# Patient Record
Sex: FEMALE | NOT HISPANIC OR LATINO | Employment: OTHER | URBAN - METROPOLITAN AREA
[De-identification: names, ages, dates, MRNs, and addresses within clinical notes are randomized per-mention and may not be internally consistent; named-entity substitution may affect disease eponyms.]

---

## 2023-09-03 PROBLEM — E03.9 HYPOTHYROIDISM (ACQUIRED): Status: ACTIVE | Noted: 2023-09-03

## 2023-09-03 PROBLEM — D80.3 IGG DEFICIENCY (MULTI): Status: ACTIVE | Noted: 2023-09-03

## 2023-09-03 PROBLEM — E83.52 HYPERCALCEMIA: Status: ACTIVE | Noted: 2023-09-03

## 2023-09-03 PROBLEM — M81.0 OSTEOPOROSIS: Status: ACTIVE | Noted: 2023-09-03

## 2023-09-03 PROBLEM — M05.79 RHEUMATOID ARTHRITIS INVOLVING MULTIPLE SITES WITH POSITIVE RHEUMATOID FACTOR (MULTI): Status: ACTIVE | Noted: 2023-09-03

## 2023-09-03 PROBLEM — I10 BENIGN HYPERTENSION: Status: ACTIVE | Noted: 2023-09-03

## 2023-09-03 PROBLEM — D75.89 MACROCYTOSIS: Status: ACTIVE | Noted: 2023-09-03

## 2023-09-03 PROBLEM — N18.2 CKD (CHRONIC KIDNEY DISEASE) STAGE 2, GFR 60-89 ML/MIN: Status: ACTIVE | Noted: 2023-09-03

## 2023-09-03 PROBLEM — K21.9 GASTROESOPHAGEAL REFLUX DISEASE: Status: ACTIVE | Noted: 2023-09-03

## 2023-09-03 PROBLEM — E55.9 VITAMIN D DEFICIENCY: Status: ACTIVE | Noted: 2023-09-03

## 2023-09-03 PROBLEM — N95.1 MENOPAUSAL SYMPTOM: Status: ACTIVE | Noted: 2023-09-03

## 2023-09-03 PROBLEM — M31.5 GIANT CELL ARTERITIS WITH POLYMYALGIA RHEUMATICA (MULTI): Status: ACTIVE | Noted: 2023-09-03

## 2023-09-03 PROBLEM — R89.9 ABNORMAL LABORATORY TEST RESULT: Status: ACTIVE | Noted: 2023-09-03

## 2023-09-03 PROBLEM — F32.A DEPRESSION: Status: ACTIVE | Noted: 2023-09-03

## 2023-09-03 PROBLEM — R73.9 HYPERGLYCEMIA: Status: ACTIVE | Noted: 2023-09-03

## 2023-09-03 PROBLEM — E78.5 DYSLIPIDEMIA: Status: ACTIVE | Noted: 2023-09-03

## 2023-09-03 RX ORDER — LISINOPRIL AND HYDROCHLOROTHIAZIDE 12.5; 2 MG/1; MG/1
1 TABLET ORAL DAILY
COMMUNITY
End: 2023-12-08

## 2023-09-03 RX ORDER — FOLIC ACID 1 MG/1
1 TABLET ORAL DAILY
COMMUNITY
End: 2024-03-19

## 2023-09-03 RX ORDER — ALENDRONATE SODIUM 70 MG/1
70 TABLET ORAL
COMMUNITY
Start: 2021-05-12 | End: 2023-12-11

## 2023-09-03 RX ORDER — MELOXICAM 7.5 MG/1
7.5 TABLET ORAL DAILY PRN
COMMUNITY
End: 2023-10-23 | Stop reason: DRUGHIGH

## 2023-09-03 RX ORDER — VIT A/VIT C/VIT E/ZINC/COPPER 4296-226
CAPSULE ORAL
COMMUNITY

## 2023-09-03 RX ORDER — ELECTROLYTES/DEXTROSE
1 SOLUTION, ORAL ORAL DAILY
COMMUNITY

## 2023-09-03 RX ORDER — HYDROXYCHLOROQUINE SULFATE 200 MG/1
200 TABLET, FILM COATED ORAL DAILY
COMMUNITY
End: 2023-12-14 | Stop reason: ALTCHOICE

## 2023-09-03 RX ORDER — LEVOTHYROXINE SODIUM 50 UG/1
50 TABLET ORAL
COMMUNITY
End: 2024-03-19

## 2023-09-03 RX ORDER — CHOLECALCIFEROL (VITAMIN D3) 50 MCG
1 TABLET ORAL DAILY
COMMUNITY

## 2023-09-03 RX ORDER — TOCILIZUMAB 20 MG/ML
INJECTION, SOLUTION, CONCENTRATE INTRAVENOUS
COMMUNITY
End: 2023-12-21 | Stop reason: SDUPTHER

## 2023-09-03 RX ORDER — AMLODIPINE BESYLATE 5 MG/1
5 TABLET ORAL DAILY
COMMUNITY
End: 2023-12-14 | Stop reason: SDUPTHER

## 2023-09-03 RX ORDER — LANOLIN ALCOHOL/MO/W.PET/CERES
1 CREAM (GRAM) TOPICAL DAILY
COMMUNITY

## 2023-09-03 RX ORDER — PYRIDOXINE HCL (VITAMIN B6) 250 MG
250 TABLET ORAL DAILY
COMMUNITY

## 2023-09-03 RX ORDER — CYCLOSPORINE 0.5 MG/ML
1 EMULSION OPHTHALMIC 2 TIMES DAILY
COMMUNITY
End: 2024-04-22 | Stop reason: ALTCHOICE

## 2023-10-23 ENCOUNTER — CLINICAL SUPPORT (OUTPATIENT)
Dept: RHEUMATOLOGY | Facility: CLINIC | Age: 78
End: 2023-10-23
Payer: MEDICARE

## 2023-10-23 VITALS
RESPIRATION RATE: 16 BRPM | HEART RATE: 74 BPM | SYSTOLIC BLOOD PRESSURE: 153 MMHG | BODY MASS INDEX: 24.32 KG/M2 | TEMPERATURE: 97.7 F | WEIGHT: 138.4 LBS | OXYGEN SATURATION: 99 % | DIASTOLIC BLOOD PRESSURE: 60 MMHG

## 2023-10-23 DIAGNOSIS — M05.79 RHEUMATOID ARTHRITIS INVOLVING MULTIPLE SITES WITH POSITIVE RHEUMATOID FACTOR (MULTI): ICD-10-CM

## 2023-10-23 PROBLEM — M81.0 AGE-RELATED OSTEOPOROSIS WITHOUT CURRENT PATHOLOGICAL FRACTURE: Status: ACTIVE | Noted: 2023-10-23

## 2023-10-23 PROCEDURE — 96413 CHEMO IV INFUSION 1 HR: CPT | Performed by: INTERNAL MEDICINE

## 2023-10-23 PROCEDURE — 2500000004 HC RX 250 GENERAL PHARMACY W/ HCPCS (ALT 636 FOR OP/ED): Mod: JZ | Performed by: INTERNAL MEDICINE

## 2023-10-23 RX ORDER — EPINEPHRINE 0.3 MG/.3ML
0.3 INJECTION SUBCUTANEOUS EVERY 5 MIN PRN
Status: CANCELLED | OUTPATIENT
Start: 2023-10-23

## 2023-10-23 RX ORDER — FAMOTIDINE 10 MG/ML
20 INJECTION INTRAVENOUS ONCE AS NEEDED
Status: CANCELLED | OUTPATIENT
Start: 2023-10-23

## 2023-10-23 RX ORDER — DIPHENHYDRAMINE HYDROCHLORIDE 50 MG/ML
50 INJECTION INTRAMUSCULAR; INTRAVENOUS AS NEEDED
Status: CANCELLED | OUTPATIENT
Start: 2023-10-23

## 2023-10-23 RX ORDER — MELOXICAM 15 MG/1
15 TABLET ORAL DAILY
COMMUNITY
Start: 2023-03-22 | End: 2024-01-26 | Stop reason: ALTCHOICE

## 2023-10-23 RX ORDER — FAMOTIDINE 10 MG/ML
20 INJECTION INTRAVENOUS ONCE AS NEEDED
Status: CANCELLED | OUTPATIENT
Start: 2023-11-20

## 2023-10-23 RX ORDER — ALBUTEROL SULFATE 0.83 MG/ML
3 SOLUTION RESPIRATORY (INHALATION) AS NEEDED
Status: CANCELLED | OUTPATIENT
Start: 2023-10-23

## 2023-10-23 RX ORDER — EPINEPHRINE 0.3 MG/.3ML
0.3 INJECTION SUBCUTANEOUS EVERY 5 MIN PRN
Status: CANCELLED | OUTPATIENT
Start: 2023-11-20

## 2023-10-23 RX ORDER — ALBUTEROL SULFATE 0.83 MG/ML
3 SOLUTION RESPIRATORY (INHALATION) AS NEEDED
Status: CANCELLED | OUTPATIENT
Start: 2023-11-20

## 2023-10-23 RX ORDER — DIPHENHYDRAMINE HYDROCHLORIDE 50 MG/ML
50 INJECTION INTRAMUSCULAR; INTRAVENOUS AS NEEDED
Status: CANCELLED | OUTPATIENT
Start: 2023-11-20

## 2023-10-23 RX ORDER — KETOROLAC TROMETHAMINE 30 MG/ML
30 INJECTION, SOLUTION INTRAMUSCULAR; INTRAVENOUS ONCE
Status: CANCELLED | OUTPATIENT
Start: 2023-10-23 | End: 2023-10-23

## 2023-10-23 RX ADMIN — TOCILIZUMAB 400 MG: 20 INJECTION, SOLUTION, CONCENTRATE INTRAVENOUS at 15:05

## 2023-10-23 NOTE — PROGRESS NOTES
Patient tolerated Actemra infusion without any signs or symptoms of adverse reaction.  IV site flushed with 10 ml NS, IV needle removed and site dressed with 2x2 gauze and bandaid.

## 2023-11-20 ENCOUNTER — APPOINTMENT (OUTPATIENT)
Dept: RHEUMATOLOGY | Facility: CLINIC | Age: 78
End: 2023-11-20
Payer: MEDICARE

## 2023-11-21 ENCOUNTER — CLINICAL SUPPORT (OUTPATIENT)
Dept: RHEUMATOLOGY | Facility: CLINIC | Age: 78
End: 2023-11-21
Payer: MEDICARE

## 2023-11-21 DIAGNOSIS — M05.79 RHEUMATOID ARTHRITIS INVOLVING MULTIPLE SITES WITH POSITIVE RHEUMATOID FACTOR (MULTI): ICD-10-CM

## 2023-11-21 PROCEDURE — 96413 CHEMO IV INFUSION 1 HR: CPT | Performed by: INTERNAL MEDICINE

## 2023-11-21 PROCEDURE — 2500000004 HC RX 250 GENERAL PHARMACY W/ HCPCS (ALT 636 FOR OP/ED): Mod: JZ | Performed by: INTERNAL MEDICINE

## 2023-11-21 RX ORDER — EPINEPHRINE 0.3 MG/.3ML
0.3 INJECTION SUBCUTANEOUS EVERY 5 MIN PRN
Status: CANCELLED | OUTPATIENT
Start: 2023-12-18

## 2023-11-21 RX ORDER — ALBUTEROL SULFATE 0.83 MG/ML
3 SOLUTION RESPIRATORY (INHALATION) AS NEEDED
Status: CANCELLED | OUTPATIENT
Start: 2023-12-18

## 2023-11-21 RX ORDER — DIPHENHYDRAMINE HYDROCHLORIDE 50 MG/ML
50 INJECTION INTRAMUSCULAR; INTRAVENOUS AS NEEDED
Status: CANCELLED | OUTPATIENT
Start: 2023-12-18

## 2023-11-21 RX ORDER — FAMOTIDINE 10 MG/ML
20 INJECTION INTRAVENOUS ONCE AS NEEDED
Status: CANCELLED | OUTPATIENT
Start: 2023-12-18

## 2023-11-21 RX ADMIN — TOCILIZUMAB: 20 INJECTION, SOLUTION, CONCENTRATE INTRAVENOUS at 13:40

## 2023-12-08 DIAGNOSIS — I10 BENIGN HYPERTENSION: Primary | ICD-10-CM

## 2023-12-08 RX ORDER — LISINOPRIL AND HYDROCHLOROTHIAZIDE 12.5; 2 MG/1; MG/1
1 TABLET ORAL DAILY
Qty: 90 TABLET | Refills: 0 | Status: SHIPPED | OUTPATIENT
Start: 2023-12-08 | End: 2024-03-15

## 2023-12-11 DIAGNOSIS — M81.0 OSTEOPOROSIS, UNSPECIFIED OSTEOPOROSIS TYPE, UNSPECIFIED PATHOLOGICAL FRACTURE PRESENCE: Primary | ICD-10-CM

## 2023-12-11 RX ORDER — ALENDRONATE SODIUM 70 MG/1
TABLET ORAL
Qty: 12 TABLET | Refills: 3 | Status: SHIPPED | OUTPATIENT
Start: 2023-12-11

## 2023-12-13 NOTE — PROGRESS NOTES
Subjective   Patient ID: Pat Delarosa is a 78 y.o. female who presents for Annual Exam.    HPI     Has history of hypertension, Hypothyroidism, Rheumatoid arthritis, Osteoporosis.         H/O Rheumatoid arthritis- Seeing Dr Boggs. Pain in lower back improving since she started infusions. Very active daily, yard work or snow blowing worsens her back pain.         H/O hypertension- compliant with medications and low-salt diet. Denied any chest pain, shortness of breath, pedal edema.         H/O hypothyroidism- compliant with medication. No recent change in levothyroxine dose.         H/O Post menopausal osteoporosis- Seeing Dr Boggs- Started alendronate in 2021. Denied any medication side effects.         complaining of generalized weakness, occasionally bilateral leg and arm weakness especially while working in the yard. leg weakness and arm weakness working in the yard.     Stated sometimes she feel depressed,  No suicidal ideations.           Does not want to get mammgram's any more.     Sees ophthalmologist every 4 weeks for macular degeneration  Complaining of difficltuy swallowing tablets, and occasionally food since last few months, denied any loss of appetite/weight loss      Review of Systems   Constitutional:  Negative for chills, fatigue and unexpected weight change.   HENT:  Negative for postnasal drip, sinus pressure and trouble swallowing.    Respiratory:  Negative for cough, shortness of breath and wheezing.    Cardiovascular:  Negative for chest pain, palpitations and leg swelling.   Gastrointestinal:  Negative for abdominal pain, blood in stool, nausea and vomiting.        Regional dysphagia   Endocrine: Negative for polydipsia, polyphagia and polyuria.   Genitourinary:  Negative for dysuria and frequency.   Musculoskeletal:  Positive for arthralgias. Negative for back pain and myalgias.   Skin:  Negative for rash.   Neurological:  Negative for tremors, seizures and numbness.  "  Psychiatric/Behavioral:  Positive for behavioral problems. Negative for suicidal ideas.        Objective   /88 (BP Location: Left arm, Patient Position: Sitting, BP Cuff Size: Small adult)   Pulse 80   Temp 36.4 °C (97.6 °F) (Temporal)   Ht 1.575 m (5' 2\")   Wt 62.7 kg (138 lb 3.2 oz)   SpO2 98%   BMI 25.28 kg/m²     Physical Exam  Constitutional:       General: She is not in acute distress.  HENT:      Head: Normocephalic and atraumatic.      Right Ear: Tympanic membrane normal.      Left Ear: Tympanic membrane normal.   Eyes:      Extraocular Movements: Extraocular movements intact.      Conjunctiva/sclera: Conjunctivae normal.      Pupils: Pupils are equal, round, and reactive to light.   Neck:      Vascular: No carotid bruit.   Cardiovascular:      Rate and Rhythm: Normal rate and regular rhythm.      Pulses: Normal pulses.      Heart sounds: No murmur heard.  Pulmonary:      Effort: Pulmonary effort is normal.      Breath sounds: Normal breath sounds. No wheezing or rales.   Abdominal:      General: Bowel sounds are normal.      Palpations: Abdomen is soft. There is no mass.      Tenderness: There is no abdominal tenderness. There is no guarding.   Musculoskeletal:         General: Normal range of motion.      Cervical back: Normal range of motion and neck supple.      Right lower leg: No edema.      Left lower leg: No edema.   Lymphadenopathy:      Cervical: No cervical adenopathy.   Skin:     General: Skin is warm and dry.      Findings: No lesion.   Neurological:      General: No focal deficit present.      Mental Status: She is alert and oriented to person, place, and time.      Sensory: No sensory deficit.      Gait: Gait normal.   Psychiatric:         Mood and Affect: Mood normal.         Assessment/Plan       Pat was seen today for annual exam.  Diagnoses and all orders for this visit:  Medicare annual wellness visit, subsequent (Primary)  Rheumatoid arthritis involving multiple sites " with positive rheumatoid factor (CMS/HCC)  Giant cell arteritis with polymyalgia rheumatica (CMS/HCC)  IgG deficiency (CMS/HCC)  Hypothyroidism (acquired)  Benign hypertension  -     amLODIPine (Norvasc) 10 mg tablet; Take 1 tablet (10 mg) by mouth once daily.  Dyslipidemia  Stage 3a chronic kidney disease (CMS/HCC)  Depression, unspecified depression type  -     sertraline (Zoloft) 25 mg tablet; Take 1 tablet (25 mg) by mouth once daily.  Post-menopausal osteoporosis  -     XR DEXA bone density; Future  Pharyngeal dysphagia  -     FL modified barium swallow study; Future  Other orders  -     Flu vaccine, quadrivalent, high-dose, preservative free, age 65y+ (FLUZONE)        Advised to limit/ avoid daily NSAIDS due to CKD  Started on zoloft  Increased amlodipine to 10 mg from 5 mg daily  Ordered barium swallow, for dysphagia    Current Outpatient Medications   Medication Instructions    alendronate (Fosamax) 70 mg tablet TAKE 1 TABLET BY MOUTH WEEKLY  WITH 8 OZ OF PLAIN WATER 30  MINUTES BEFORE FIRST FOOD, DRINK OR MEDS. STAY UPRIGHT FOR 30  MINS    amLODIPine (NORVASC) 10 mg, oral, Daily    biotin 5 mg capsule 1 capsule, oral, Daily    cholecalciferol (Vitamin D-3) 50 MCG (2000 UT) tablet 1 capsule, oral, Daily    cyanocobalamin (Vitamin B-12) 1,000 mcg tablet 1 tablet, oral, Daily    cycloSPORINE (Restasis) 0.05 % ophthalmic emulsion 1 drop, ophthalmic (eye), 2 times daily    folic acid (FOLVITE) 1 mg, oral, Daily    levothyroxine (SYNTHROID, LEVOXYL) 50 mcg, oral, Daily before breakfast, on an empty stomach    lisinopriL-hydrochlorothiazide 20-12.5 mg tablet 1 tablet, oral, Daily    meloxicam (MOBIC) 15 mg, oral, Daily    pyridoxine (VITAMIN B-6) 250 mg, oral, Daily    sertraline (ZOLOFT) 25 mg, oral, Daily    tocilizumab (Actemra) 400 mg/20 mL (20 mg/mL) solution  as directed Intravenous    vitamins A,C,E-zinc-copper (PreserVision AREDS) 4,296 mcg-226 mg-90 mg capsule as directed Orally

## 2023-12-14 ENCOUNTER — OFFICE VISIT (OUTPATIENT)
Dept: PRIMARY CARE | Facility: CLINIC | Age: 78
End: 2023-12-14
Payer: MEDICARE

## 2023-12-14 VITALS
WEIGHT: 138.2 LBS | BODY MASS INDEX: 25.43 KG/M2 | HEIGHT: 62 IN | TEMPERATURE: 97.6 F | SYSTOLIC BLOOD PRESSURE: 148 MMHG | OXYGEN SATURATION: 98 % | HEART RATE: 80 BPM | DIASTOLIC BLOOD PRESSURE: 88 MMHG

## 2023-12-14 DIAGNOSIS — M31.5 GIANT CELL ARTERITIS WITH POLYMYALGIA RHEUMATICA (MULTI): ICD-10-CM

## 2023-12-14 DIAGNOSIS — I10 BENIGN HYPERTENSION: ICD-10-CM

## 2023-12-14 DIAGNOSIS — Z00.00 MEDICARE ANNUAL WELLNESS VISIT, SUBSEQUENT: Primary | ICD-10-CM

## 2023-12-14 DIAGNOSIS — R13.13 PHARYNGEAL DYSPHAGIA: ICD-10-CM

## 2023-12-14 DIAGNOSIS — E78.5 DYSLIPIDEMIA: ICD-10-CM

## 2023-12-14 DIAGNOSIS — D80.3 IGG DEFICIENCY (MULTI): ICD-10-CM

## 2023-12-14 DIAGNOSIS — M81.0 POST-MENOPAUSAL OSTEOPOROSIS: ICD-10-CM

## 2023-12-14 DIAGNOSIS — E03.9 HYPOTHYROIDISM (ACQUIRED): ICD-10-CM

## 2023-12-14 DIAGNOSIS — F32.A DEPRESSION, UNSPECIFIED DEPRESSION TYPE: ICD-10-CM

## 2023-12-14 DIAGNOSIS — N18.31 STAGE 3A CHRONIC KIDNEY DISEASE (MULTI): ICD-10-CM

## 2023-12-14 DIAGNOSIS — M05.79 RHEUMATOID ARTHRITIS INVOLVING MULTIPLE SITES WITH POSITIVE RHEUMATOID FACTOR (MULTI): ICD-10-CM

## 2023-12-14 PROBLEM — N18.2 CKD (CHRONIC KIDNEY DISEASE) STAGE 2, GFR 60-89 ML/MIN: Status: RESOLVED | Noted: 2023-09-03 | Resolved: 2023-12-14

## 2023-12-14 PROCEDURE — 1036F TOBACCO NON-USER: CPT | Performed by: INTERNAL MEDICINE

## 2023-12-14 PROCEDURE — 3077F SYST BP >= 140 MM HG: CPT | Performed by: INTERNAL MEDICINE

## 2023-12-14 PROCEDURE — 3079F DIAST BP 80-89 MM HG: CPT | Performed by: INTERNAL MEDICINE

## 2023-12-14 PROCEDURE — 1126F AMNT PAIN NOTED NONE PRSNT: CPT | Performed by: INTERNAL MEDICINE

## 2023-12-14 PROCEDURE — 1159F MED LIST DOCD IN RCRD: CPT | Performed by: INTERNAL MEDICINE

## 2023-12-14 PROCEDURE — 99215 OFFICE O/P EST HI 40 MIN: CPT | Performed by: INTERNAL MEDICINE

## 2023-12-14 PROCEDURE — G0439 PPPS, SUBSEQ VISIT: HCPCS | Performed by: INTERNAL MEDICINE

## 2023-12-14 PROCEDURE — G0008 ADMIN INFLUENZA VIRUS VAC: HCPCS | Performed by: INTERNAL MEDICINE

## 2023-12-14 PROCEDURE — 1157F ADVNC CARE PLAN IN RCRD: CPT | Performed by: INTERNAL MEDICINE

## 2023-12-14 RX ORDER — AMLODIPINE BESYLATE 10 MG/1
10 TABLET ORAL DAILY
Qty: 90 TABLET | Refills: 1 | Status: SHIPPED | OUTPATIENT
Start: 2023-12-14 | End: 2024-01-24

## 2023-12-14 RX ORDER — SERTRALINE HYDROCHLORIDE 25 MG/1
25 TABLET, FILM COATED ORAL DAILY
Qty: 90 TABLET | Refills: 0 | Status: SHIPPED | OUTPATIENT
Start: 2023-12-14 | End: 2024-01-26 | Stop reason: SDUPTHER

## 2023-12-14 ASSESSMENT — ENCOUNTER SYMPTOMS
LOSS OF SENSATION IN FEET: 0
DYSURIA: 0
ARTHRALGIAS: 1
ABDOMINAL PAIN: 0
FREQUENCY: 0
NUMBNESS: 0
SINUS PRESSURE: 0
UNEXPECTED WEIGHT CHANGE: 0
TROUBLE SWALLOWING: 0
POLYPHAGIA: 0
POLYDIPSIA: 0
COUGH: 0
PALPITATIONS: 0
FATIGUE: 0
SHORTNESS OF BREATH: 0
OCCASIONAL FEELINGS OF UNSTEADINESS: 0
NAUSEA: 0
TREMORS: 0
MYALGIAS: 0
DEPRESSION: 0
VOMITING: 0
CHILLS: 0
WHEEZING: 0
SEIZURES: 0
BACK PAIN: 0
BLOOD IN STOOL: 0

## 2023-12-14 ASSESSMENT — COLUMBIA-SUICIDE SEVERITY RATING SCALE - C-SSRS
6. HAVE YOU EVER DONE ANYTHING, STARTED TO DO ANYTHING, OR PREPARED TO DO ANYTHING TO END YOUR LIFE?: NO
1. IN THE PAST MONTH, HAVE YOU WISHED YOU WERE DEAD OR WISHED YOU COULD GO TO SLEEP AND NOT WAKE UP?: NO
2. HAVE YOU ACTUALLY HAD ANY THOUGHTS OF KILLING YOURSELF?: NO

## 2023-12-14 ASSESSMENT — PATIENT HEALTH QUESTIONNAIRE - PHQ9
SUM OF ALL RESPONSES TO PHQ9 QUESTIONS 1 AND 2: 0
1. LITTLE INTEREST OR PLEASURE IN DOING THINGS: NOT AT ALL
2. FEELING DOWN, DEPRESSED OR HOPELESS: NOT AT ALL

## 2023-12-14 ASSESSMENT — PAIN SCALES - GENERAL: PAINLEVEL: 0-NO PAIN

## 2023-12-20 ENCOUNTER — CLINICAL SUPPORT (OUTPATIENT)
Dept: RHEUMATOLOGY | Facility: CLINIC | Age: 78
End: 2023-12-20
Payer: MEDICARE

## 2023-12-20 VITALS
TEMPERATURE: 97.7 F | BODY MASS INDEX: 25.2 KG/M2 | WEIGHT: 137.79 LBS | SYSTOLIC BLOOD PRESSURE: 151 MMHG | DIASTOLIC BLOOD PRESSURE: 60 MMHG | HEART RATE: 79 BPM

## 2023-12-20 DIAGNOSIS — M05.79 RHEUMATOID ARTHRITIS INVOLVING MULTIPLE SITES WITH POSITIVE RHEUMATOID FACTOR (MULTI): ICD-10-CM

## 2023-12-20 PROCEDURE — 96365 THER/PROPH/DIAG IV INF INIT: CPT | Performed by: INTERNAL MEDICINE

## 2023-12-20 PROCEDURE — 2500000004 HC RX 250 GENERAL PHARMACY W/ HCPCS (ALT 636 FOR OP/ED): Mod: JZ | Performed by: INTERNAL MEDICINE

## 2023-12-20 RX ORDER — EPINEPHRINE 0.3 MG/.3ML
0.3 INJECTION SUBCUTANEOUS EVERY 5 MIN PRN
Status: CANCELLED | OUTPATIENT
Start: 2024-01-16

## 2023-12-20 RX ORDER — DIPHENHYDRAMINE HYDROCHLORIDE 50 MG/ML
50 INJECTION INTRAMUSCULAR; INTRAVENOUS AS NEEDED
Status: CANCELLED | OUTPATIENT
Start: 2024-01-16

## 2023-12-20 RX ORDER — ALBUTEROL SULFATE 0.83 MG/ML
3 SOLUTION RESPIRATORY (INHALATION) AS NEEDED
Status: CANCELLED | OUTPATIENT
Start: 2024-01-16

## 2023-12-20 RX ORDER — FAMOTIDINE 10 MG/ML
20 INJECTION INTRAVENOUS ONCE AS NEEDED
Status: CANCELLED | OUTPATIENT
Start: 2024-01-16

## 2023-12-20 RX ADMIN — TOCILIZUMAB 502 MG: 20 INJECTION, SOLUTION, CONCENTRATE INTRAVENOUS at 10:00

## 2023-12-20 NOTE — PROGRESS NOTES
Patient tolerated infusion without reaction, vitals signs stable. Next appointment scheduled and given to patient.

## 2023-12-21 DIAGNOSIS — M05.79 RHEUMATOID ARTHRITIS INVOLVING MULTIPLE SITES WITH POSITIVE RHEUMATOID FACTOR (MULTI): Primary | ICD-10-CM

## 2023-12-21 RX ORDER — TOCILIZUMAB 20 MG/ML
8 INJECTION, SOLUTION, CONCENTRATE INTRAVENOUS
Qty: 25 ML | Refills: 11 | Status: SHIPPED | OUTPATIENT
Start: 2023-12-21 | End: 2023-12-28 | Stop reason: SDUPTHER

## 2023-12-28 DIAGNOSIS — M05.79 RHEUMATOID ARTHRITIS INVOLVING MULTIPLE SITES WITH POSITIVE RHEUMATOID FACTOR (MULTI): ICD-10-CM

## 2023-12-28 RX ORDER — TOCILIZUMAB 20 MG/ML
8 INJECTION, SOLUTION, CONCENTRATE INTRAVENOUS
Qty: 40 ML | Refills: 11 | Status: SHIPPED | OUTPATIENT
Start: 2023-12-28 | End: 2024-01-17 | Stop reason: SDUPTHER

## 2024-01-17 ENCOUNTER — CLINICAL SUPPORT (OUTPATIENT)
Dept: RHEUMATOLOGY | Facility: CLINIC | Age: 79
End: 2024-01-17
Payer: MEDICARE

## 2024-01-17 VITALS
BODY MASS INDEX: 25.28 KG/M2 | SYSTOLIC BLOOD PRESSURE: 183 MMHG | DIASTOLIC BLOOD PRESSURE: 75 MMHG | WEIGHT: 138.23 LBS | HEART RATE: 88 BPM | TEMPERATURE: 97.7 F

## 2024-01-17 DIAGNOSIS — M05.79 RHEUMATOID ARTHRITIS INVOLVING MULTIPLE SITES WITH POSITIVE RHEUMATOID FACTOR (MULTI): ICD-10-CM

## 2024-01-17 PROCEDURE — 2500000004 HC RX 250 GENERAL PHARMACY W/ HCPCS (ALT 636 FOR OP/ED): Performed by: INTERNAL MEDICINE

## 2024-01-17 PROCEDURE — 96413 CHEMO IV INFUSION 1 HR: CPT | Performed by: INTERNAL MEDICINE

## 2024-01-17 RX ORDER — TOCILIZUMAB 20 MG/ML
8 INJECTION, SOLUTION, CONCENTRATE INTRAVENOUS
Qty: 40 ML | Refills: 11 | Status: SHIPPED
Start: 2024-01-17 | End: 2024-04-22 | Stop reason: ALTCHOICE

## 2024-01-17 RX ORDER — EPINEPHRINE 0.3 MG/.3ML
0.3 INJECTION SUBCUTANEOUS EVERY 5 MIN PRN
OUTPATIENT
Start: 2024-01-23

## 2024-01-17 RX ORDER — FAMOTIDINE 10 MG/ML
20 INJECTION INTRAVENOUS ONCE AS NEEDED
OUTPATIENT
Start: 2024-01-23

## 2024-01-17 RX ORDER — DIPHENHYDRAMINE HYDROCHLORIDE 50 MG/ML
50 INJECTION INTRAMUSCULAR; INTRAVENOUS AS NEEDED
OUTPATIENT
Start: 2024-01-23

## 2024-01-17 RX ORDER — ALBUTEROL SULFATE 0.83 MG/ML
3 SOLUTION RESPIRATORY (INHALATION) AS NEEDED
OUTPATIENT
Start: 2024-01-23

## 2024-01-17 RX ADMIN — TOCILIZUMAB 502 MG: 20 INJECTION, SOLUTION, CONCENTRATE INTRAVENOUS at 09:33

## 2024-01-22 ENCOUNTER — HOSPITAL ENCOUNTER (OUTPATIENT)
Dept: RADIOLOGY | Facility: HOSPITAL | Age: 79
Discharge: HOME | End: 2024-01-22
Payer: MEDICARE

## 2024-01-22 DIAGNOSIS — M81.0 POST-MENOPAUSAL OSTEOPOROSIS: ICD-10-CM

## 2024-01-22 PROCEDURE — 77085 DXA BONE DENSITY AXL VRT FX: CPT

## 2024-01-24 DIAGNOSIS — I10 BENIGN HYPERTENSION: ICD-10-CM

## 2024-01-24 RX ORDER — AMLODIPINE BESYLATE 10 MG/1
10 TABLET ORAL DAILY
Qty: 100 TABLET | Refills: 0 | Status: SHIPPED | OUTPATIENT
Start: 2024-01-24 | End: 2024-04-29

## 2024-01-26 ENCOUNTER — OFFICE VISIT (OUTPATIENT)
Dept: PRIMARY CARE | Facility: CLINIC | Age: 79
End: 2024-01-26
Payer: MEDICARE

## 2024-01-26 VITALS
BODY MASS INDEX: 24.33 KG/M2 | TEMPERATURE: 97.5 F | DIASTOLIC BLOOD PRESSURE: 68 MMHG | SYSTOLIC BLOOD PRESSURE: 140 MMHG | WEIGHT: 133 LBS | OXYGEN SATURATION: 99 % | HEART RATE: 86 BPM

## 2024-01-26 DIAGNOSIS — I10 BENIGN HYPERTENSION: ICD-10-CM

## 2024-01-26 DIAGNOSIS — E03.9 HYPOTHYROIDISM (ACQUIRED): ICD-10-CM

## 2024-01-26 DIAGNOSIS — F32.A DEPRESSION, UNSPECIFIED DEPRESSION TYPE: Primary | ICD-10-CM

## 2024-01-26 PROCEDURE — 1126F AMNT PAIN NOTED NONE PRSNT: CPT | Performed by: INTERNAL MEDICINE

## 2024-01-26 PROCEDURE — 99213 OFFICE O/P EST LOW 20 MIN: CPT | Performed by: INTERNAL MEDICINE

## 2024-01-26 PROCEDURE — 1159F MED LIST DOCD IN RCRD: CPT | Performed by: INTERNAL MEDICINE

## 2024-01-26 PROCEDURE — 3078F DIAST BP <80 MM HG: CPT | Performed by: INTERNAL MEDICINE

## 2024-01-26 PROCEDURE — 1157F ADVNC CARE PLAN IN RCRD: CPT | Performed by: INTERNAL MEDICINE

## 2024-01-26 PROCEDURE — 3077F SYST BP >= 140 MM HG: CPT | Performed by: INTERNAL MEDICINE

## 2024-01-26 PROCEDURE — 1036F TOBACCO NON-USER: CPT | Performed by: INTERNAL MEDICINE

## 2024-01-26 RX ORDER — SERTRALINE HYDROCHLORIDE 25 MG/1
25 TABLET, FILM COATED ORAL DAILY
Qty: 90 TABLET | Refills: 1 | Status: SHIPPED | OUTPATIENT
Start: 2024-01-26 | End: 2024-06-11 | Stop reason: ALTCHOICE

## 2024-01-26 ASSESSMENT — ENCOUNTER SYMPTOMS
TREMORS: 0
OCCASIONAL FEELINGS OF UNSTEADINESS: 0
SHORTNESS OF BREATH: 0
LOSS OF SENSATION IN FEET: 0
UNEXPECTED WEIGHT CHANGE: 0
POLYDIPSIA: 0
DEPRESSION: 0
CHILLS: 0
DYSURIA: 0
NUMBNESS: 0
FATIGUE: 0
WHEEZING: 0
MYALGIAS: 0
BLOOD IN STOOL: 0
ARTHRALGIAS: 1
ABDOMINAL PAIN: 0
FREQUENCY: 0
TROUBLE SWALLOWING: 0
VOMITING: 0
POLYPHAGIA: 0
SINUS PRESSURE: 0
SEIZURES: 0
PALPITATIONS: 0
COUGH: 0
NAUSEA: 0
BACK PAIN: 0

## 2024-01-26 ASSESSMENT — LIFESTYLE VARIABLES: HOW MANY STANDARD DRINKS CONTAINING ALCOHOL DO YOU HAVE ON A TYPICAL DAY: PATIENT DOES NOT DRINK

## 2024-01-26 ASSESSMENT — PAIN SCALES - GENERAL: PAINLEVEL: 0-NO PAIN

## 2024-01-26 NOTE — PROGRESS NOTES
Subjective   Patient ID: Pat Delarosa is a 78 y.o. female who presents for Follow-up (6wk follow up).    HPI     Has history of hypertension, Hypothyroidism, Rheumatoid arthritis, Osteoporosis.         H/O Rheumatoid arthritis- Seeing Dr Boggs. Pain in lower back improving since she started infusions. Very active daily, yard work or snow blowing worsens her back pain.         H/O hypertension- compliant with medications and low-salt diet. Denied any chest pain, shortness of breath, pedal edema.         H/O hypothyroidism- compliant with medication. No recent change in levothyroxine dose.         H/O Post menopausal osteoporosis- Seeing Dr Boggs- Started alendronate in 2021. Denied any medication side effects.         complaining of generalized weakness, occasionally bilateral leg and arm weakness especially while working in the yard. leg weakness and arm weakness working in the yard.     Stated sometimes she feel depressed,  No suicidal ideations. Started on Zoloft few weeks ago             Does not want to get mammogram's any more.     Sees ophthalmologist every 4 weeks for macular degeneration  Complaining of difficulty swallowing tablets, and occasionally food since last few months, denied any loss of appetite/weight loss      Review of Systems   Constitutional:  Negative for chills, fatigue and unexpected weight change.   HENT:  Negative for postnasal drip, sinus pressure and trouble swallowing.    Respiratory:  Negative for cough, shortness of breath and wheezing.    Cardiovascular:  Negative for chest pain, palpitations and leg swelling.   Gastrointestinal:  Negative for abdominal pain, blood in stool, nausea and vomiting.        Regional dysphagia   Endocrine: Negative for polydipsia, polyphagia and polyuria.   Genitourinary:  Negative for dysuria and frequency.   Musculoskeletal:  Positive for arthralgias. Negative for back pain and myalgias.   Skin:  Negative for rash.   Neurological:  Negative for  tremors, seizures and numbness.   Psychiatric/Behavioral:  Positive for behavioral problems. Negative for suicidal ideas.        Objective   /68 (BP Location: Left arm, Patient Position: Sitting, BP Cuff Size: Adult)   Pulse 86   Temp 36.4 °C (97.5 °F) (Temporal)   Wt 60.3 kg (133 lb)   SpO2 99%   BMI 24.33 kg/m²     Physical Exam  Constitutional:       General: She is not in acute distress.  HENT:      Head: Normocephalic and atraumatic.      Right Ear: Tympanic membrane normal.      Left Ear: Tympanic membrane normal.   Eyes:      Extraocular Movements: Extraocular movements intact.      Conjunctiva/sclera: Conjunctivae normal.      Pupils: Pupils are equal, round, and reactive to light.   Neck:      Vascular: No carotid bruit.   Cardiovascular:      Rate and Rhythm: Normal rate and regular rhythm.      Pulses: Normal pulses.      Heart sounds: No murmur heard.  Pulmonary:      Effort: Pulmonary effort is normal.      Breath sounds: Normal breath sounds. No wheezing or rales.   Abdominal:      General: Bowel sounds are normal.      Palpations: Abdomen is soft. There is no mass.      Tenderness: There is no abdominal tenderness. There is no guarding.   Musculoskeletal:         General: Normal range of motion.      Cervical back: Normal range of motion and neck supple.      Right lower leg: No edema.      Left lower leg: No edema.   Lymphadenopathy:      Cervical: No cervical adenopathy.   Skin:     General: Skin is warm and dry.      Findings: No lesion.   Neurological:      General: No focal deficit present.      Mental Status: She is alert and oriented to person, place, and time.      Sensory: No sensory deficit.      Gait: Gait normal.   Psychiatric:         Mood and Affect: Mood normal.         Assessment/Plan     Pat was seen today for follow-up.  Diagnoses and all orders for this visit:  Depression, unspecified depression type (Primary)  -     sertraline (Zoloft) 25 mg tablet; Take 1 tablet (25  mg) by mouth once daily.  Benign hypertension  Hypothyroidism (acquired)               Advised to limit/ avoid daily NSAID'S due to CKD  Symptoms better on Zoloft -she is not crying as much as before, stated she is able to control her anxiety better  Increased amlodipine to 10 mg from 5 mg daily  Ordered barium swallow, for dysphagia- she was not able to schedule yet, advised to take sips of water before she takes medications due to dry mouth and see if she notices any difference  Get barium swallow as scheduled      === 01/22/24 ===    DEXA BONE DENSITY AXIAL SKELETON W VFA    - Impression -  DEXA:  According to World Health Organization criteria,  classification is low bone mass (osteopenia)    Followup recommended in two years or sooner as clinically warranted.    VFA:  NO VERTEBRAL FRACTURES WERE SEEN.    All images and detailed analysis are available on the  Radiology  PACS.    MACRO:  None    Signed by: Louis Elkins 1/22/2024 4:39 PM  Dictation workstation:   [id number]   Current Outpatient Medications   Medication Instructions    Actemra 8 mg/kg, intravenous, Every 28 days, as directed Intravenous    alendronate (Fosamax) 70 mg tablet TAKE 1 TABLET BY MOUTH WEEKLY  WITH 8 OZ OF PLAIN WATER 30  MINUTES BEFORE FIRST FOOD, DRINK OR MEDS. STAY UPRIGHT FOR 30  MINS    amLODIPine (NORVASC) 10 mg, oral, Daily    biotin 5 mg capsule 1 capsule, oral, Daily    cholecalciferol (Vitamin D-3) 50 MCG (2000 UT) tablet 1 capsule, oral, Daily    cyanocobalamin (Vitamin B-12) 1,000 mcg tablet 1 tablet, oral, Daily    cycloSPORINE (Restasis) 0.05 % ophthalmic emulsion 1 drop, ophthalmic (eye), 2 times daily    folic acid (FOLVITE) 1 mg, oral, Daily    levothyroxine (SYNTHROID, LEVOXYL) 50 mcg, oral, Daily before breakfast, on an empty stomach    lisinopriL-hydrochlorothiazide 20-12.5 mg tablet 1 tablet, oral, Daily    pyridoxine (VITAMIN B-6) 250 mg, oral, Daily    sertraline (ZOLOFT) 25 mg, oral, Daily    vitamins  A,C,E-zinc-copper (PreserVision AREDS) 4,296 mcg-226 mg-90 mg capsule as directed Orally

## 2024-02-13 ENCOUNTER — HOSPITAL ENCOUNTER (OUTPATIENT)
Dept: RADIOLOGY | Facility: HOSPITAL | Age: 79
Discharge: HOME | End: 2024-02-13
Payer: MEDICARE

## 2024-02-13 DIAGNOSIS — R13.13 PHARYNGEAL DYSPHAGIA: ICD-10-CM

## 2024-02-13 DIAGNOSIS — R13.12 OROPHARYNGEAL DYSPHAGIA: Primary | ICD-10-CM

## 2024-02-13 PROCEDURE — 92611 MOTION FLUOROSCOPY/SWALLOW: CPT | Mod: GN | Performed by: PHARMACIST

## 2024-02-13 PROCEDURE — 2500000001 HC RX 250 WO HCPCS SELF ADMINISTERED DRUGS (ALT 637 FOR MEDICARE OP): Performed by: INTERNAL MEDICINE

## 2024-02-13 PROCEDURE — 74230 X-RAY XM SWLNG FUNCJ C+: CPT

## 2024-02-13 PROCEDURE — 3430000001 HC RX 343 DIAGNOSTIC RADIOPHARMACEUTICALS: Performed by: INTERNAL MEDICINE

## 2024-02-13 RX ADMIN — BARIUM SULFATE 700 MG: 700 TABLET ORAL at 13:40

## 2024-02-13 RX ADMIN — BARIUM SULFATE 10 ML: 400 PASTE ORAL at 13:38

## 2024-02-13 RX ADMIN — BARIUM SULFATE 90 ML: 400 SUSPENSION ORAL at 13:39

## 2024-02-13 RX ADMIN — BARIUM SULFATE 5 ML: 400 SUSPENSION ORAL at 13:36

## 2024-02-13 RX ADMIN — BARIUM SULFATE 120 ML: 0.81 POWDER, FOR SUSPENSION ORAL at 13:40

## 2024-02-13 NOTE — PROCEDURES
"    Modified Barium Swallow Study     Patient Name: Pat Delarosa  MRN: [id number]  : 1945  Today's Date: 24   Start time:  1252  Stop time:  1336  Time calculation (min) :  44         Recommendations:  Continue regular texture solids and thin liquids. Trial moistening mouth/throat with Biotene or other moisturizing agent prior to taking pills. Trial taking pills in puree carrier such as applesauce or yogurt. Chew thoroughly. Add extra moisture to solids as needed.     Assessment/Impression:    Full detailed SLP/Radiologist Modified Barium Swallow study report can be found under Chart Review tab, Imaging tab and  titled \"FL Modified Barium Swallow Study\"      Pt. presenting with min oropharyngeal dysphagia characterized by mildly reduced movement of some oral/pharyngeal structures resulting in occasional penetration without aspiration and trace-min pharyngeal residue. This could be secondary to mild weakness vs pt's reported throat dryness.     Plan:  SLP Plan: No treatment needs identified at this time     Pain:   Rating 0-10: 0        Education:   Pt was educated re: general results of study and recommended safe swallow strategies.  "

## 2024-02-14 ENCOUNTER — APPOINTMENT (OUTPATIENT)
Dept: RHEUMATOLOGY | Facility: CLINIC | Age: 79
End: 2024-02-14
Payer: MEDICARE

## 2024-02-28 ENCOUNTER — DOCUMENTATION (OUTPATIENT)
Dept: INFUSION THERAPY | Facility: CLINIC | Age: 79
End: 2024-02-28
Payer: MEDICARE

## 2024-02-28 NOTE — PROGRESS NOTES
"Patient to be scheduled for Continuation of Actemra infusions.     For Diagnosis: Giant Cell Arthritis  and rheumatoid arthritis    Dosing is weight based at: 8 mg / kg     Using Dosing weight of: 60.3 kg    For a Total dose of: 500 mg every 28 days    Labs..  T Spot Drawn/Results:   Lab Results   Component Value Date    TBGRES Negative  Reference range: NEGATIVE   12/03/2019      Hep B Drawn/Results:  Lab Results   Component Value Date    HEPBSAG NEGATIVE 12/03/2019        Lipids drawn:   Lab Results   Component Value Date    CHOL 195 06/07/2023    CHOL 176 08/29/2022    CHOL 163 07/12/2021     Lab Results   Component Value Date     06/07/2023    HDL 95 08/29/2022    HDL 81 07/12/2021     Lab Results   Component Value Date    LDLCALC 66 06/07/2023    LDLCALC 71 08/29/2022    LDLCALC 62 (L) 07/12/2021     Lab Results   Component Value Date    TRIG 75 06/07/2023    TRIG 51 08/29/2022    TRIG 101 07/12/2021     No components found for: \"CHOLHDL\"     CBC w/ diff drawn:   Lab Results   Component Value Date    WBC 4.5 06/07/2023    HGB 13.9 06/07/2023    HCT 41.0 06/07/2023    MCV 96.5 06/07/2023     06/07/2023        WBC:   Lab Results   Component Value Date    WBC 4.5 06/07/2023        Plt:   Lab Results   Component Value Date     06/07/2023      (Initial: >=100,000. Hold/contact prescribing provider if: <=100,000)    ANC:   Lab Results   Component Value Date    NEUTROABS 2.50 06/07/2023      (Initial: >2000   Hold/contact prescribing provider if:  <1000)    ALT:   Lab Results   Component Value Date    ALT 24 06/07/2023      (Initial: <1.5X ULN (and/or)  Hold/contact prescribing provider if: >3X ULN)    AST:   Lab Results   Component Value Date    ALT 24 06/07/2023    AST 32 06/07/2023    ALKPHOS 57 06/07/2023    BILITOT 0.7 06/07/2023        Chemistry    Lab Results   Component Value Date/Time     07/07/2023 0907    K 4.7 07/07/2023 0907     07/07/2023 0907    CO2 24 07/07/2023 0907    " BUN 33 (H) 07/07/2023 0907    CREATININE 1.0 07/07/2023 0907    Lab Results   Component Value Date/Time    CALCIUM 9.4 07/07/2023 0907    CALCIUM 9.4 07/07/2023 0907    ALKPHOS 57 06/07/2023 0943    AST 32 06/07/2023 0943    ALT 24 06/07/2023 0943    BILITOT 0.7 06/07/2023 0943           (Initial: <1.5X ULN (and/or) Hold/contact prescribing provider if : >3X ULN)    Urine Hcg test ordered? Not applicable (If female pt <60 years of age and with reproductive capability)   (If urine Hcg test ordered please instruct pt upon scheduling to drink 32 ounces of water 1 hour before arrival so bladder is full for needed urine sample)    History of malignancy or GI perforation? No  Patient Active Problem List   Diagnosis    Abnormal laboratory test result    Depression    Dyslipidemia    Benign hypertension    Gastroesophageal reflux disease    Hypercalcemia    Hyperglycemia    Hypothyroidism (acquired)    IgG deficiency (CMS/HCC)    Macrocytosis    Menopausal symptom    Osteoporosis    Giant cell arteritis with polymyalgia rheumatica (CMS/HCC)    Rheumatoid arthritis involving multiple sites with positive rheumatoid factor (CMS/HCC)    Vitamin D deficiency    Age-related osteoporosis without current pathological fracture    Oropharyngeal dysphagia      Past Medical History:   Diagnosis Date    CKD (chronic kidney disease) stage 2, GFR 60-89 ml/min 09/03/2023        Last infusion received: 1/17/24 (if continuation)   Due: Overdue    These results meet criteria to treat.

## 2024-02-29 DIAGNOSIS — M19.90 ARTHRITIS: Primary | ICD-10-CM

## 2024-03-02 RX ORDER — MELOXICAM 15 MG/1
15 TABLET ORAL DAILY
Qty: 90 TABLET | Refills: 1 | Status: SHIPPED | OUTPATIENT
Start: 2024-03-02 | End: 2024-04-22 | Stop reason: ALTCHOICE

## 2024-03-15 DIAGNOSIS — I10 BENIGN HYPERTENSION: ICD-10-CM

## 2024-03-15 RX ORDER — LISINOPRIL AND HYDROCHLOROTHIAZIDE 12.5; 2 MG/1; MG/1
1 TABLET ORAL DAILY
Qty: 90 TABLET | Refills: 1 | Status: SHIPPED | OUTPATIENT
Start: 2024-03-15

## 2024-03-18 DIAGNOSIS — E03.9 HYPOTHYROIDISM (ACQUIRED): Primary | ICD-10-CM

## 2024-03-19 DIAGNOSIS — M05.79 RHEUMATOID ARTHRITIS INVOLVING MULTIPLE SITES WITH POSITIVE RHEUMATOID FACTOR (MULTI): Primary | ICD-10-CM

## 2024-03-19 RX ORDER — LEVOTHYROXINE SODIUM 50 UG/1
TABLET ORAL
Qty: 100 TABLET | Refills: 2 | Status: SHIPPED | OUTPATIENT
Start: 2024-03-19 | End: 2024-06-11 | Stop reason: SDUPTHER

## 2024-03-19 RX ORDER — FOLIC ACID 1 MG/1
1 TABLET ORAL DAILY
Qty: 100 TABLET | Refills: 2 | Status: SHIPPED | OUTPATIENT
Start: 2024-03-19 | End: 2024-06-11 | Stop reason: ALTCHOICE

## 2024-03-20 DIAGNOSIS — M05.79 RHEUMATOID ARTHRITIS INVOLVING MULTIPLE SITES WITH POSITIVE RHEUMATOID FACTOR (MULTI): Primary | ICD-10-CM

## 2024-03-20 RX ORDER — HYDROXYCHLOROQUINE SULFATE 200 MG/1
TABLET, FILM COATED ORAL DAILY
Qty: 100 TABLET | Refills: 2 | Status: SHIPPED | OUTPATIENT
Start: 2024-03-20 | End: 2024-06-11 | Stop reason: ALTCHOICE

## 2024-04-22 ENCOUNTER — OFFICE VISIT (OUTPATIENT)
Dept: PRIMARY CARE | Facility: CLINIC | Age: 79
End: 2024-04-22
Payer: MEDICARE

## 2024-04-22 VITALS
OXYGEN SATURATION: 97 % | HEIGHT: 62 IN | HEART RATE: 97 BPM | TEMPERATURE: 96.8 F | SYSTOLIC BLOOD PRESSURE: 140 MMHG | BODY MASS INDEX: 26.13 KG/M2 | WEIGHT: 142 LBS | DIASTOLIC BLOOD PRESSURE: 74 MMHG

## 2024-04-22 DIAGNOSIS — I10 BENIGN HYPERTENSION: Primary | ICD-10-CM

## 2024-04-22 DIAGNOSIS — E78.5 DYSLIPIDEMIA: ICD-10-CM

## 2024-04-22 DIAGNOSIS — E03.9 HYPOTHYROIDISM (ACQUIRED): ICD-10-CM

## 2024-04-22 DIAGNOSIS — E55.9 VITAMIN D DEFICIENCY: ICD-10-CM

## 2024-04-22 PROCEDURE — 3078F DIAST BP <80 MM HG: CPT | Performed by: INTERNAL MEDICINE

## 2024-04-22 PROCEDURE — 1159F MED LIST DOCD IN RCRD: CPT | Performed by: INTERNAL MEDICINE

## 2024-04-22 PROCEDURE — 1157F ADVNC CARE PLAN IN RCRD: CPT | Performed by: INTERNAL MEDICINE

## 2024-04-22 PROCEDURE — 99213 OFFICE O/P EST LOW 20 MIN: CPT | Performed by: INTERNAL MEDICINE

## 2024-04-22 PROCEDURE — 3077F SYST BP >= 140 MM HG: CPT | Performed by: INTERNAL MEDICINE

## 2024-04-22 PROCEDURE — 1126F AMNT PAIN NOTED NONE PRSNT: CPT | Performed by: INTERNAL MEDICINE

## 2024-04-22 PROCEDURE — 1124F ACP DISCUSS-NO DSCNMKR DOCD: CPT | Performed by: INTERNAL MEDICINE

## 2024-04-22 ASSESSMENT — PATIENT HEALTH QUESTIONNAIRE - PHQ9
1. LITTLE INTEREST OR PLEASURE IN DOING THINGS: NOT AT ALL
2. FEELING DOWN, DEPRESSED OR HOPELESS: NOT AT ALL
SUM OF ALL RESPONSES TO PHQ9 QUESTIONS 1 AND 2: 0

## 2024-04-22 ASSESSMENT — COLUMBIA-SUICIDE SEVERITY RATING SCALE - C-SSRS
1. IN THE PAST MONTH, HAVE YOU WISHED YOU WERE DEAD OR WISHED YOU COULD GO TO SLEEP AND NOT WAKE UP?: NO
2. HAVE YOU ACTUALLY HAD ANY THOUGHTS OF KILLING YOURSELF?: NO
6. HAVE YOU EVER DONE ANYTHING, STARTED TO DO ANYTHING, OR PREPARED TO DO ANYTHING TO END YOUR LIFE?: NO

## 2024-04-22 ASSESSMENT — ENCOUNTER SYMPTOMS
OCCASIONAL FEELINGS OF UNSTEADINESS: 0
LOSS OF SENSATION IN FEET: 0
DEPRESSION: 0

## 2024-04-22 ASSESSMENT — PAIN SCALES - GENERAL: PAINLEVEL: 0-NO PAIN

## 2024-04-22 NOTE — PROGRESS NOTES
Subjective   Patient ID: Pat Delarosa is a 78 y.o. female who presents for 3 month f/u.    HPI     Has history of hypertension, Hypothyroidism, Rheumatoid arthritis, Osteoporosis.         H/O Rheumatoid arthritis- Seeing Dr Boggs. Pain in lower back improving since she started infusions. Very active daily, yard work or snow blowing worsens her back pain.         H/O hypertension- compliant with medications and low-salt diet. Denied any chest pain, shortness of breath, pedal edema.         H/O hypothyroidism- compliant with medication. No recent change in levothyroxine dose.         H/O Post menopausal osteoporosis- Seeing Dr Boggs- Started alendronate in 2021. Denied any medication side effects.    Depression symptoms in winter 2023, started on Zoloft.  Initially she felt better, since last few weeks stated she does not like the way she is feeling about herself.  She wants to stop Zoloft and see if symptoms improve.  Denied any suicidal thoughts           Does not want to get mammogram's any more.     Sees ophthalmologist every 4 weeks for macular degeneration  Complaining of difficulty swallowing tablets, and occasionally food since last few months, denied any loss of appetite/weight loss      Dysphagia better, had barium swallow in February    Review of Systems   Constitutional:  Negative for chills, fatigue and unexpected weight change.   HENT:  Negative for postnasal drip, sinus pressure and trouble swallowing.    Respiratory:  Negative for cough, shortness of breath and wheezing.    Cardiovascular:  Negative for chest pain, palpitations and leg swelling.   Gastrointestinal:  Negative for abdominal pain, blood in stool, nausea and vomiting.   Endocrine: Negative for polydipsia, polyphagia and polyuria.   Genitourinary:  Negative for dysuria and frequency.   Musculoskeletal:  Positive for arthralgias. Negative for back pain and myalgias.   Skin:  Negative for rash.   Neurological:  Negative for tremors,  "seizures and numbness.   Psychiatric/Behavioral:  Positive for behavioral problems. Negative for suicidal ideas.        Objective   /74 (BP Location: Right arm, Patient Position: Sitting, BP Cuff Size: Small adult)   Pulse 97   Temp 36 °C (96.8 °F) (Temporal)   Ht 1.575 m (5' 2\")   Wt 64.4 kg (142 lb)   SpO2 97%   BMI 25.97 kg/m²     Physical Exam  Constitutional:       General: She is not in acute distress.  HENT:      Head: Normocephalic and atraumatic.   Eyes:      Extraocular Movements: Extraocular movements intact.      Conjunctiva/sclera: Conjunctivae normal.   Cardiovascular:      Rate and Rhythm: Normal rate and regular rhythm.      Pulses: Normal pulses.      Heart sounds: No murmur heard.  Pulmonary:      Effort: Pulmonary effort is normal.      Breath sounds: Normal breath sounds. No wheezing or rales.   Abdominal:      General: Bowel sounds are normal.      Palpations: Abdomen is soft. There is no mass.      Tenderness: There is no abdominal tenderness. There is no guarding.   Musculoskeletal:      Right lower leg: No edema.      Left lower leg: No edema.   Neurological:      General: No focal deficit present.      Mental Status: She is alert and oriented to person, place, and time.      Cranial Nerves: No cranial nerve deficit.      Gait: Gait normal.   Psychiatric:         Mood and Affect: Mood normal.         Assessment/Plan       Pat was seen today for 3 month f/u.  Diagnoses and all orders for this visit:  Benign hypertension (Primary)  Hypothyroidism (acquired)  -     TSH with reflex to Free T4 if abnormal; Future  Vitamin D deficiency  -     Vitamin D 25-Hydroxy,Total (for eval of Vitamin D levels); Future  Dyslipidemia      Wants to come off of sertraline, advised to stop it when she is ready    Current Outpatient Medications   Medication Instructions    alendronate (Fosamax) 70 mg tablet TAKE 1 TABLET BY MOUTH WEEKLY  WITH 8 OZ OF PLAIN WATER 30  MINUTES BEFORE FIRST FOOD, DRINK OR " MEDS. STAY UPRIGHT FOR 30  MINS    amLODIPine (NORVASC) 10 mg, oral, Daily    biotin 5 mg capsule 1 capsule, oral, Daily    cholecalciferol (Vitamin D-3) 50 MCG (2000 UT) tablet 1 capsule, oral, Daily    cyanocobalamin (Vitamin B-12) 1,000 mcg tablet 1 tablet, oral, Daily    folic acid (FOLVITE) 1 mg, oral, Daily    hydroxychloroquine (Plaquenil) 200 mg tablet oral, Daily    levothyroxine (Synthroid, Levoxyl) 50 mcg tablet TAKE 1 TABLET BY MOUTH ON AN  EMPTY STOMACH ONCE DAILY IN THE  MORNING    lisinopriL-hydrochlorothiazide 20-12.5 mg tablet 1 tablet, oral, Daily    pyridoxine (VITAMIN B-6) 250 mg, oral, Daily    sertraline (ZOLOFT) 25 mg, oral, Daily    vitamins A,C,E-zinc-copper (PreserVision AREDS) 4,296 mcg-226 mg-90 mg capsule as directed Orally

## 2024-04-23 ASSESSMENT — ENCOUNTER SYMPTOMS
VOMITING: 0
WHEEZING: 0
SHORTNESS OF BREATH: 0
POLYDIPSIA: 0
UNEXPECTED WEIGHT CHANGE: 0
BACK PAIN: 0
ARTHRALGIAS: 1
PALPITATIONS: 0
COUGH: 0
CHILLS: 0
ABDOMINAL PAIN: 0
FATIGUE: 0
FREQUENCY: 0
SINUS PRESSURE: 0
MYALGIAS: 0
DYSURIA: 0
NAUSEA: 0
TROUBLE SWALLOWING: 0
POLYPHAGIA: 0
BLOOD IN STOOL: 0
SEIZURES: 0
NUMBNESS: 0
TREMORS: 0

## 2024-04-24 ENCOUNTER — APPOINTMENT (OUTPATIENT)
Dept: PRIMARY CARE | Facility: CLINIC | Age: 79
End: 2024-04-24
Payer: MEDICARE

## 2024-04-27 DIAGNOSIS — I10 BENIGN HYPERTENSION: ICD-10-CM

## 2024-04-29 RX ORDER — AMLODIPINE BESYLATE 10 MG/1
10 TABLET ORAL DAILY
Qty: 100 TABLET | Refills: 0 | Status: SHIPPED | OUTPATIENT
Start: 2024-04-29

## 2024-05-03 ENCOUNTER — LAB (OUTPATIENT)
Dept: LAB | Facility: LAB | Age: 79
End: 2024-05-03
Payer: MEDICARE

## 2024-05-03 DIAGNOSIS — E55.9 VITAMIN D DEFICIENCY: ICD-10-CM

## 2024-05-03 DIAGNOSIS — M05.79 RHEUMATOID ARTHRITIS INVOLVING MULTIPLE SITES WITH POSITIVE RHEUMATOID FACTOR (MULTI): ICD-10-CM

## 2024-05-03 DIAGNOSIS — E03.9 HYPOTHYROIDISM (ACQUIRED): ICD-10-CM

## 2024-05-03 LAB
25(OH)D3 SERPL-MCNC: 52 NG/ML (ref 31–100)
ALBUMIN SERPL-MCNC: 4.5 G/DL (ref 3.5–5)
ALP BLD-CCNC: 76 U/L (ref 35–125)
ALT SERPL-CCNC: 18 U/L (ref 5–40)
ANION GAP SERPL CALC-SCNC: 14 MMOL/L
AST SERPL-CCNC: 23 U/L (ref 5–40)
BILIRUB SERPL-MCNC: 0.4 MG/DL (ref 0.1–1.2)
BUN SERPL-MCNC: 17 MG/DL (ref 8–25)
CALCIUM SERPL-MCNC: 9.2 MG/DL (ref 8.5–10.4)
CHLORIDE SERPL-SCNC: 104 MMOL/L (ref 97–107)
CHOLEST SERPL-MCNC: 171 MG/DL (ref 133–200)
CHOLEST/HDLC SERPL: 2.1 {RATIO}
CO2 SERPL-SCNC: 23 MMOL/L (ref 24–31)
CREAT SERPL-MCNC: 1 MG/DL (ref 0.4–1.6)
EGFRCR SERPLBLD CKD-EPI 2021: 58 ML/MIN/1.73M*2
ERYTHROCYTE [DISTWIDTH] IN BLOOD BY AUTOMATED COUNT: 12.2 % (ref 11.5–14.5)
GLUCOSE SERPL-MCNC: 110 MG/DL (ref 65–99)
HCT VFR BLD AUTO: 40.1 % (ref 36–46)
HDLC SERPL-MCNC: 81 MG/DL
HGB BLD-MCNC: 12.9 G/DL (ref 12–16)
LDLC SERPL CALC-MCNC: 79 MG/DL (ref 65–130)
MCH RBC QN AUTO: 32 PG (ref 26–34)
MCHC RBC AUTO-ENTMCNC: 32.2 G/DL (ref 32–36)
MCV RBC AUTO: 100 FL (ref 80–100)
NRBC BLD-RTO: 0 /100 WBCS (ref 0–0)
PLATELET # BLD AUTO: 227 X10*3/UL (ref 150–450)
POTASSIUM SERPL-SCNC: 4.7 MMOL/L (ref 3.4–5.1)
PROT SERPL-MCNC: 6.9 G/DL (ref 5.9–7.9)
RBC # BLD AUTO: 4.03 X10*6/UL (ref 4–5.2)
SODIUM SERPL-SCNC: 141 MMOL/L (ref 133–145)
TRIGL SERPL-MCNC: 53 MG/DL (ref 40–150)
TSH SERPL DL<=0.05 MIU/L-ACNC: 0.94 MIU/L (ref 0.27–4.2)
WBC # BLD AUTO: 7.7 X10*3/UL (ref 4.4–11.3)

## 2024-05-03 PROCEDURE — 36415 COLL VENOUS BLD VENIPUNCTURE: CPT

## 2024-05-03 PROCEDURE — 82306 VITAMIN D 25 HYDROXY: CPT

## 2024-05-03 PROCEDURE — 84443 ASSAY THYROID STIM HORMONE: CPT

## 2024-05-03 PROCEDURE — 80053 COMPREHEN METABOLIC PANEL: CPT

## 2024-05-03 PROCEDURE — 80061 LIPID PANEL: CPT

## 2024-05-03 PROCEDURE — 85027 COMPLETE CBC AUTOMATED: CPT

## 2024-06-11 ENCOUNTER — OFFICE VISIT (OUTPATIENT)
Dept: PRIMARY CARE | Facility: CLINIC | Age: 79
End: 2024-06-11
Payer: MEDICARE

## 2024-06-11 VITALS
DIASTOLIC BLOOD PRESSURE: 78 MMHG | HEART RATE: 92 BPM | OXYGEN SATURATION: 97 % | SYSTOLIC BLOOD PRESSURE: 140 MMHG | TEMPERATURE: 97.1 F | WEIGHT: 142 LBS | BODY MASS INDEX: 26.13 KG/M2 | HEIGHT: 62 IN

## 2024-06-11 DIAGNOSIS — N18.31 STAGE 3A CHRONIC KIDNEY DISEASE (MULTI): ICD-10-CM

## 2024-06-11 DIAGNOSIS — R60.0 PEDAL EDEMA: Primary | ICD-10-CM

## 2024-06-11 DIAGNOSIS — M05.79 RHEUMATOID ARTHRITIS INVOLVING MULTIPLE SITES WITH POSITIVE RHEUMATOID FACTOR (MULTI): ICD-10-CM

## 2024-06-11 DIAGNOSIS — E03.9 HYPOTHYROIDISM (ACQUIRED): ICD-10-CM

## 2024-06-11 DIAGNOSIS — D80.3 IGG DEFICIENCY (MULTI): ICD-10-CM

## 2024-06-11 PROCEDURE — 1126F AMNT PAIN NOTED NONE PRSNT: CPT | Performed by: INTERNAL MEDICINE

## 2024-06-11 PROCEDURE — 99213 OFFICE O/P EST LOW 20 MIN: CPT | Performed by: INTERNAL MEDICINE

## 2024-06-11 PROCEDURE — 3077F SYST BP >= 140 MM HG: CPT | Performed by: INTERNAL MEDICINE

## 2024-06-11 PROCEDURE — 1157F ADVNC CARE PLAN IN RCRD: CPT | Performed by: INTERNAL MEDICINE

## 2024-06-11 PROCEDURE — 1159F MED LIST DOCD IN RCRD: CPT | Performed by: INTERNAL MEDICINE

## 2024-06-11 PROCEDURE — 3078F DIAST BP <80 MM HG: CPT | Performed by: INTERNAL MEDICINE

## 2024-06-11 RX ORDER — HYDROCHLOROTHIAZIDE 12.5 MG/1
12.5 TABLET ORAL DAILY
Qty: 30 TABLET | Refills: 1 | Status: SHIPPED | OUTPATIENT
Start: 2024-06-11 | End: 2024-08-10

## 2024-06-11 RX ORDER — LEVOTHYROXINE SODIUM 50 UG/1
TABLET ORAL
Qty: 100 TABLET | Refills: 2 | Status: SHIPPED | OUTPATIENT
Start: 2024-06-11

## 2024-06-11 ASSESSMENT — ENCOUNTER SYMPTOMS
POLYPHAGIA: 0
NUMBNESS: 0
DYSURIA: 0
BACK PAIN: 0
CHILLS: 0
DEPRESSION: 0
PALPITATIONS: 0
WHEEZING: 0
OCCASIONAL FEELINGS OF UNSTEADINESS: 0
BLOOD IN STOOL: 0
UNEXPECTED WEIGHT CHANGE: 0
TREMORS: 0
NAUSEA: 0
MYALGIAS: 0
FREQUENCY: 0
POLYDIPSIA: 0
ABDOMINAL PAIN: 0
SEIZURES: 0
SINUS PRESSURE: 0
VOMITING: 0
SHORTNESS OF BREATH: 0
COUGH: 0
FATIGUE: 0
ARTHRALGIAS: 1
LOSS OF SENSATION IN FEET: 0
TROUBLE SWALLOWING: 0

## 2024-06-11 ASSESSMENT — COLUMBIA-SUICIDE SEVERITY RATING SCALE - C-SSRS
6. HAVE YOU EVER DONE ANYTHING, STARTED TO DO ANYTHING, OR PREPARED TO DO ANYTHING TO END YOUR LIFE?: NO
2. HAVE YOU ACTUALLY HAD ANY THOUGHTS OF KILLING YOURSELF?: NO
1. IN THE PAST MONTH, HAVE YOU WISHED YOU WERE DEAD OR WISHED YOU COULD GO TO SLEEP AND NOT WAKE UP?: NO

## 2024-06-11 ASSESSMENT — PATIENT HEALTH QUESTIONNAIRE - PHQ9
2. FEELING DOWN, DEPRESSED OR HOPELESS: NOT AT ALL
SUM OF ALL RESPONSES TO PHQ9 QUESTIONS 1 AND 2: 0
1. LITTLE INTEREST OR PLEASURE IN DOING THINGS: NOT AT ALL

## 2024-06-11 ASSESSMENT — PAIN SCALES - GENERAL: PAINLEVEL: 0-NO PAIN

## 2024-06-11 NOTE — PROGRESS NOTES
Subjective   Patient ID: Pat Delarosa is a 79 y.o. female who presents for Leg Swelling (Legs/abdomen/Hand swelling1.5 Months).    HPI     Has history of hypertension, Hypothyroidism, Rheumatoid arthritis, Osteoporosis.         H/O Rheumatoid arthritis- Seeing Dr Boggs. Pain in lower back improving since she started infusions. Very active daily, yard work or snow blowing worsens her back pain.         H/O hypertension- compliant with medications and low-salt diet. Denied any chest pain, shortness of breath, pedal edema.         H/O hypothyroidism- compliant with medication. No recent change in levothyroxine dose.         H/O Post menopausal osteoporosis- Seeing Dr Boggs- Started alendronate in 2021. Denied any medication side effects.    Depression symptoms in winter 2023, started on Zoloft.  Initially she felt better, since last few weeks stated she does not like the way she is feeling about herself.  She wants to stop Zoloft and see if symptoms improve.  Denied any suicidal thoughts           Does not want to get mammogram's any more.     Sees ophthalmologist every 4 weeks for macular degeneration  Complaining of difficulty swallowing tablets, and occasionally food since last few months, denied any loss of appetite/weight loss      Dysphagia better, had barium swallow in February    Bilateral pedal edema since last few weeks, stated she had nurse visit her house who noticed swelling in her legs.  Swelling better in the morning  Denied any shortness of breath, chest pain, orthopnea, PND    Review of Systems   Constitutional:  Negative for chills, fatigue and unexpected weight change.   HENT:  Negative for postnasal drip, sinus pressure and trouble swallowing.    Respiratory:  Negative for cough, shortness of breath and wheezing.    Cardiovascular:  Positive for leg swelling. Negative for chest pain and palpitations.   Gastrointestinal:  Negative for abdominal pain, blood in stool, nausea and vomiting.  "  Endocrine: Negative for polydipsia, polyphagia and polyuria.   Genitourinary:  Negative for dysuria and frequency.   Musculoskeletal:  Positive for arthralgias. Negative for back pain and myalgias.   Skin:  Negative for rash.   Neurological:  Negative for tremors, seizures and numbness.   Psychiatric/Behavioral:  Positive for behavioral problems. Negative for suicidal ideas.        Objective   /78 (BP Location: Left arm, Patient Position: Sitting, BP Cuff Size: Small adult)   Pulse 92   Temp 36.2 °C (97.1 °F) (Temporal)   Ht 1.575 m (5' 2\")   Wt 64.4 kg (142 lb)   SpO2 97%   BMI 25.97 kg/m²     Physical Exam  Constitutional:       General: She is not in acute distress.  HENT:      Head: Normocephalic and atraumatic.   Eyes:      Extraocular Movements: Extraocular movements intact.      Conjunctiva/sclera: Conjunctivae normal.   Cardiovascular:      Rate and Rhythm: Normal rate and regular rhythm.      Pulses: Normal pulses.      Heart sounds: No murmur heard.  Pulmonary:      Effort: Pulmonary effort is normal.      Breath sounds: Normal breath sounds. No wheezing or rales.   Abdominal:      General: Bowel sounds are normal.      Palpations: Abdomen is soft. There is no mass.      Tenderness: There is no abdominal tenderness. There is no guarding.   Musculoskeletal:      Right lower leg: No edema.      Left lower leg: No edema.   Neurological:      General: No focal deficit present.      Mental Status: She is alert and oriented to person, place, and time.      Cranial Nerves: No cranial nerve deficit.      Gait: Gait normal.   Psychiatric:         Mood and Affect: Mood normal.         Assessment/Plan       Pat was seen today for leg swelling.  Diagnoses and all orders for this visit:  Pedal edema (Primary)  -     hydroCHLOROthiazide (Microzide) 12.5 mg tablet; Take 1 tablet (12.5 mg) by mouth once daily.  Hypothyroidism (acquired)  -     levothyroxine (Synthroid, Levoxyl) 50 mcg tablet; TAKE 1 TABLET " BY MOUTH ON AN  EMPTY STOMACH ONCE DAILY IN THE  MORNING  Stage 3a chronic kidney disease (Multi)  Rheumatoid arthritis involving multiple sites with positive rheumatoid factor (Multi)  IgG deficiency (Multi)      Trace pedal edema bilaterally, advise low-salt diet  Keep feet elevated while resting  Started on hydrochlorothiazide 12.5 mg, also on combination lisinopril hydrochlorothiazide  Wear compression socks in the morning    Current Outpatient Medications   Medication Instructions    alendronate (Fosamax) 70 mg tablet TAKE 1 TABLET BY MOUTH WEEKLY  WITH 8 OZ OF PLAIN WATER 30  MINUTES BEFORE FIRST FOOD, DRINK OR MEDS. STAY UPRIGHT FOR 30  MINS    amLODIPine (NORVASC) 10 mg, oral, Daily    biotin 5 mg capsule 1 capsule, oral, Daily    hydroCHLOROthiazide (MICROZIDE) 12.5 mg, oral, Daily    levothyroxine (Synthroid, Levoxyl) 50 mcg tablet TAKE 1 TABLET BY MOUTH ON AN  EMPTY STOMACH ONCE DAILY IN THE  MORNING    lisinopriL-hydrochlorothiazide 20-12.5 mg tablet 1 tablet, oral, Daily

## 2024-06-13 PROBLEM — N18.31 STAGE 3A CHRONIC KIDNEY DISEASE (MULTI): Status: ACTIVE | Noted: 2024-06-13

## 2024-06-13 PROBLEM — M31.5 GIANT CELL ARTERITIS WITH POLYMYALGIA RHEUMATICA (MULTI): Status: RESOLVED | Noted: 2023-09-03 | Resolved: 2024-06-13

## 2024-06-17 DIAGNOSIS — I10 BENIGN HYPERTENSION: ICD-10-CM

## 2024-06-17 RX ORDER — LISINOPRIL AND HYDROCHLOROTHIAZIDE 12.5; 2 MG/1; MG/1
1 TABLET ORAL DAILY
Qty: 90 TABLET | Refills: 0 | Status: SHIPPED | OUTPATIENT
Start: 2024-06-17

## 2024-07-06 DIAGNOSIS — I10 BENIGN HYPERTENSION: ICD-10-CM

## 2024-07-08 RX ORDER — AMLODIPINE BESYLATE 10 MG/1
10 TABLET ORAL DAILY
Qty: 100 TABLET | Refills: 2 | Status: SHIPPED | OUTPATIENT
Start: 2024-07-08

## 2024-08-02 ENCOUNTER — HOSPITAL ENCOUNTER (EMERGENCY)
Facility: HOSPITAL | Age: 79
Discharge: HOME | End: 2024-08-02
Payer: MEDICARE

## 2024-08-02 VITALS
WEIGHT: 141.54 LBS | HEART RATE: 78 BPM | OXYGEN SATURATION: 100 % | TEMPERATURE: 97.7 F | BODY MASS INDEX: 26.05 KG/M2 | SYSTOLIC BLOOD PRESSURE: 156 MMHG | HEIGHT: 62 IN | RESPIRATION RATE: 16 BRPM | DIASTOLIC BLOOD PRESSURE: 74 MMHG

## 2024-08-02 DIAGNOSIS — N30.00 ACUTE CYSTITIS WITHOUT HEMATURIA: ICD-10-CM

## 2024-08-02 DIAGNOSIS — M25.50 MULTIPLE JOINT PAIN: Primary | ICD-10-CM

## 2024-08-02 LAB
ALBUMIN SERPL-MCNC: 4.1 G/DL (ref 3.5–5)
ALP BLD-CCNC: 86 U/L (ref 35–125)
ALT SERPL-CCNC: 27 U/L (ref 5–40)
ANION GAP SERPL CALC-SCNC: 17 MMOL/L
APPEARANCE UR: CLEAR
AST SERPL-CCNC: 33 U/L (ref 5–40)
BACTERIA #/AREA URNS AUTO: ABNORMAL /HPF
BASOPHILS # BLD AUTO: 0.02 X10*3/UL (ref 0–0.1)
BASOPHILS NFR BLD AUTO: 0.2 %
BILIRUB SERPL-MCNC: 0.5 MG/DL (ref 0.1–1.2)
BILIRUB UR STRIP.AUTO-MCNC: NEGATIVE MG/DL
BUN SERPL-MCNC: 26 MG/DL (ref 8–25)
CALCIUM SERPL-MCNC: 9.8 MG/DL (ref 8.5–10.4)
CHLORIDE SERPL-SCNC: 96 MMOL/L (ref 97–107)
CO2 SERPL-SCNC: 22 MMOL/L (ref 24–31)
COLOR UR: YELLOW
CREAT SERPL-MCNC: 0.8 MG/DL (ref 0.4–1.6)
EGFRCR SERPLBLD CKD-EPI 2021: 75 ML/MIN/1.73M*2
EOSINOPHIL # BLD AUTO: 0.06 X10*3/UL (ref 0–0.4)
EOSINOPHIL NFR BLD AUTO: 0.6 %
ERYTHROCYTE [DISTWIDTH] IN BLOOD BY AUTOMATED COUNT: 11.4 % (ref 11.5–14.5)
GLUCOSE SERPL-MCNC: 108 MG/DL (ref 65–99)
GLUCOSE UR STRIP.AUTO-MCNC: NORMAL MG/DL
HCT VFR BLD AUTO: 41.2 % (ref 36–46)
HGB BLD-MCNC: 14 G/DL (ref 12–16)
HOLD SPECIMEN: NORMAL
HYALINE CASTS #/AREA URNS AUTO: ABNORMAL /LPF
IMM GRANULOCYTES # BLD AUTO: 0.02 X10*3/UL (ref 0–0.5)
IMM GRANULOCYTES NFR BLD AUTO: 0.2 % (ref 0–0.9)
KETONES UR STRIP.AUTO-MCNC: ABNORMAL MG/DL
LEUKOCYTE ESTERASE UR QL STRIP.AUTO: ABNORMAL
LYMPHOCYTES # BLD AUTO: 1.67 X10*3/UL (ref 0.8–3)
LYMPHOCYTES NFR BLD AUTO: 16.9 %
MCH RBC QN AUTO: 31.1 PG (ref 26–34)
MCHC RBC AUTO-ENTMCNC: 34 G/DL (ref 32–36)
MCV RBC AUTO: 92 FL (ref 80–100)
MONOCYTES # BLD AUTO: 0.64 X10*3/UL (ref 0.05–0.8)
MONOCYTES NFR BLD AUTO: 6.5 %
MUCOUS THREADS #/AREA URNS AUTO: ABNORMAL /LPF
NEUTROPHILS # BLD AUTO: 7.46 X10*3/UL (ref 1.6–5.5)
NEUTROPHILS NFR BLD AUTO: 75.6 %
NITRITE UR QL STRIP.AUTO: NEGATIVE
NRBC BLD-RTO: 0 /100 WBCS (ref 0–0)
PH UR STRIP.AUTO: 5.5 [PH]
PLATELET # BLD AUTO: 330 X10*3/UL (ref 150–450)
POTASSIUM SERPL-SCNC: 3.5 MMOL/L (ref 3.4–5.1)
PROT SERPL-MCNC: 8 G/DL (ref 5.9–7.9)
PROT UR STRIP.AUTO-MCNC: ABNORMAL MG/DL
RBC # BLD AUTO: 4.5 X10*6/UL (ref 4–5.2)
RBC # UR STRIP.AUTO: NEGATIVE /UL
RBC #/AREA URNS AUTO: ABNORMAL /HPF
SODIUM SERPL-SCNC: 135 MMOL/L (ref 133–145)
SP GR UR STRIP.AUTO: 1.02
SQUAMOUS #/AREA URNS AUTO: ABNORMAL /HPF
TRANS CELLS #/AREA UR COMP ASSIST: ABNORMAL /HPF
UROBILINOGEN UR STRIP.AUTO-MCNC: NORMAL MG/DL
WBC # BLD AUTO: 9.9 X10*3/UL (ref 4.4–11.3)
WBC #/AREA URNS AUTO: ABNORMAL /HPF

## 2024-08-02 PROCEDURE — 2500000004 HC RX 250 GENERAL PHARMACY W/ HCPCS (ALT 636 FOR OP/ED)

## 2024-08-02 PROCEDURE — 96372 THER/PROPH/DIAG INJ SC/IM: CPT

## 2024-08-02 PROCEDURE — 36415 COLL VENOUS BLD VENIPUNCTURE: CPT

## 2024-08-02 PROCEDURE — 2500000001 HC RX 250 WO HCPCS SELF ADMINISTERED DRUGS (ALT 637 FOR MEDICARE OP)

## 2024-08-02 PROCEDURE — 85025 COMPLETE CBC W/AUTO DIFF WBC: CPT

## 2024-08-02 PROCEDURE — 87086 URINE CULTURE/COLONY COUNT: CPT | Mod: WESLAB

## 2024-08-02 PROCEDURE — 99283 EMERGENCY DEPT VISIT LOW MDM: CPT | Mod: 25

## 2024-08-02 PROCEDURE — 81001 URINALYSIS AUTO W/SCOPE: CPT

## 2024-08-02 PROCEDURE — 84075 ASSAY ALKALINE PHOSPHATASE: CPT

## 2024-08-02 RX ORDER — TRAMADOL HYDROCHLORIDE 50 MG/1
50 TABLET ORAL EVERY 6 HOURS PRN
Qty: 10 TABLET | Refills: 0 | Status: SHIPPED | OUTPATIENT
Start: 2024-08-02 | End: 2024-08-05

## 2024-08-02 RX ORDER — KETOROLAC TROMETHAMINE 30 MG/ML
15 INJECTION, SOLUTION INTRAMUSCULAR; INTRAVENOUS ONCE
Status: COMPLETED | OUTPATIENT
Start: 2024-08-02 | End: 2024-08-02

## 2024-08-02 RX ORDER — CEPHALEXIN 500 MG/1
500 CAPSULE ORAL 2 TIMES DAILY
Qty: 14 CAPSULE | Refills: 0 | Status: SHIPPED | OUTPATIENT
Start: 2024-08-02 | End: 2024-08-09

## 2024-08-02 RX ORDER — ACETAMINOPHEN 325 MG/1
975 TABLET ORAL ONCE
Status: COMPLETED | OUTPATIENT
Start: 2024-08-02 | End: 2024-08-02

## 2024-08-02 RX ORDER — TRAMADOL HYDROCHLORIDE 50 MG/1
50 TABLET ORAL ONCE
Status: COMPLETED | OUTPATIENT
Start: 2024-08-02 | End: 2024-08-02

## 2024-08-02 ASSESSMENT — PAIN DESCRIPTION - LOCATION
LOCATION: GENERALIZED
LOCATION: BACK
LOCATION: BACK

## 2024-08-02 ASSESSMENT — PAIN DESCRIPTION - PAIN TYPE
TYPE: ACUTE PAIN
TYPE: CHRONIC PAIN

## 2024-08-02 ASSESSMENT — PAIN SCALES - GENERAL
PAINLEVEL_OUTOF10: 3
PAINLEVEL_OUTOF10: 9
PAINLEVEL_OUTOF10: 6

## 2024-08-02 ASSESSMENT — PAIN DESCRIPTION - FREQUENCY: FREQUENCY: CONSTANT/CONTINUOUS

## 2024-08-02 ASSESSMENT — PAIN - FUNCTIONAL ASSESSMENT
PAIN_FUNCTIONAL_ASSESSMENT: 0-10
PAIN_FUNCTIONAL_ASSESSMENT: 0-10

## 2024-08-02 ASSESSMENT — PAIN DESCRIPTION - PROGRESSION
CLINICAL_PROGRESSION: GRADUALLY IMPROVING
CLINICAL_PROGRESSION: NOT CHANGED

## 2024-08-02 ASSESSMENT — LIFESTYLE VARIABLES
HAVE PEOPLE ANNOYED YOU BY CRITICIZING YOUR DRINKING: NO
HAVE YOU EVER FELT YOU SHOULD CUT DOWN ON YOUR DRINKING: NO
TOTAL SCORE: 0
EVER HAD A DRINK FIRST THING IN THE MORNING TO STEADY YOUR NERVES TO GET RID OF A HANGOVER: NO
EVER FELT BAD OR GUILTY ABOUT YOUR DRINKING: NO

## 2024-08-02 ASSESSMENT — PAIN DESCRIPTION - DESCRIPTORS: DESCRIPTORS: ACHING;THROBBING

## 2024-08-02 ASSESSMENT — PAIN DESCRIPTION - ONSET: ONSET: PROGRESSIVE

## 2024-08-02 NOTE — ED PROVIDER NOTES
HPI   Chief Complaint   Patient presents with    Joint Pain     Rheumatoid arthritis pain       Patient is a 79-year-old female presents emergency department for evaluation of widespread joint pain.  Patient has longstanding history of rheumatoid arthritis and states that she typically has joint pain in her wrists and feet.  She states that she used to be on multiple different medications and infusions and injections for her pain.  She states they have now titrated her off all of those as most of them are not working as well as they used to.  She states over the last week she has had increasing pain in all of her joints throughout her body.  She states typically she has a lot of pain in her back, but now it is in her shoulders, elbows, wrists, hands, ankles, feet, hips, thighs.  She denies any specific injury or trauma.  She denies any numbness or tingling.  She denies any fevers, chills, lightheadedness, dizziness, nausea, vomiting, or other symptoms at this time.  She states that she only has been taking Tylenol for the pain which does not seem to be helping.      History provided by:  Patient   used: No            Patient History   Past Medical History:   Diagnosis Date    CKD (chronic kidney disease) stage 2, GFR 60-89 ml/min 09/03/2023     No past surgical history on file.  Family History   Problem Relation Name Age of Onset    Uterine cancer Mother      Cancer Mother      Lung cancer Brother      Prostate cancer Brother      Liver disease Son       Social History     Tobacco Use    Smoking status: Former     Types: Cigarettes    Smokeless tobacco: Never   Vaping Use    Vaping status: Never Used   Substance Use Topics    Alcohol use: Not Currently    Drug use: Never       Physical Exam   ED Triage Vitals [08/02/24 1108]   Temperature Heart Rate Respirations BP   36.5 °C (97.7 °F) (!) 104 20 (!) 177/93      Pulse Ox Temp Source Heart Rate Source Patient Position   100 % Oral Brachial Sitting       BP Location FiO2 (%)     Left arm --       Physical Exam  Constitutional:       Appearance: Normal appearance.   Cardiovascular:      Rate and Rhythm: Regular rhythm. Tachycardia present.   Pulmonary:      Effort: Pulmonary effort is normal.      Breath sounds: Normal breath sounds.   Abdominal:      General: Abdomen is flat.      Palpations: Abdomen is soft.      Tenderness: There is no abdominal tenderness.   Musculoskeletal:         General: No swelling or deformity. Normal range of motion.   Skin:     General: Skin is warm and dry.   Neurological:      General: No focal deficit present.      Mental Status: She is alert and oriented to person, place, and time.           ED Course & MDM   Diagnoses as of 08/02/24 2112   Multiple joint pain   Acute cystitis without hematuria                       [place] Coma Scale Score: 15                        Medical Decision Making  Patient is a 79-year-old female presents emergency department for evaluation of generalized joint pain with history of rheumatoid arthritis and no other chronic medical problems.    Lab work done today included CMP, CBC, and urinalysis.  Lab work with evidence of urinary tract infection and otherwise unremarkable.    Scans not warranted at today's visit.    Medications given at today's visit include IV Toradol, p.o. tramadol, p.o. Tylenol    I saw this patient dependently.  Patient feeling much improved after medications given emergency department.  She is able to ambulate without any difficulty.  Lab work without acute abnormality with evidence of urinary tract infection.  Suspect patient pain likely exacerbation of her rheumatoid arthritis given widespread nature of joint pain.  No obvious joint swelling, lesions, or skin changes.  Patient be started on short course of medication to help with pain and educated follow-up closely with primary care provider.  She is agreeable plan and discharge at this time.  Emergent pathologies were  considered for this patient, although I have low suspicion for anything acutely emergent given patient's clinical presentation, history, physical exam, stable vital signs, and relatively unremarkable workup.  Discharging patient home is reasonable plan of care for outpatient management.    All labs, imaging, and diagnostic studies were reviewed by me and patient was counseled on clinical impression, expectations, and plan.  Patient was educated to follow-up with PCP in the following 1-2 days.  All questions from patient were answered. They elicited understanding and were agreeable to course of treatment.  Patient was discharged in stable condition and given strict return precautions.    Prescriptions given on discharge: PO tramadol    ** Disclaimer:  Parts of this document were written utilizing a voice to text dictation software.  Note may contain minor transcription or typographical errors that were inadvertently transcribed by the computer software.        Procedure  Procedures     Nevin Ochoa PA-C  08/02/24 1180

## 2024-08-02 NOTE — DISCHARGE INSTRUCTIONS
Please follow close with your primary care provider in the following week.  Continue Tylenol and ibuprofen as needed for aches and pains.  Give additional medication to help with pain.  Take and complete course of antibiotics for urinary tract infection

## 2024-08-03 LAB — BACTERIA UR CULT: ABNORMAL

## 2024-08-05 ENCOUNTER — TELEPHONE (OUTPATIENT)
Dept: PHARMACY | Facility: HOSPITAL | Age: 79
End: 2024-08-05
Payer: MEDICARE

## 2024-08-05 NOTE — PROGRESS NOTES
EDPD Note: Antibiotics Reviewed and Warranted    Contacted Mr./Mrs./Ms. Pat Delarosa regarding a positive urine culture/result that was taken during their recent emergency room visit. I completed education with patient . The patient is being treated appropriately with Keflex. Patient endorses foul smelling urine and dark yellow urine. Told patient to increase her fluid intake. Advised patient to finish full course of antibiotic and follow up with PCP or urgent care if symptoms do not completely resolve.      Susceptibility data from last 90 days.  Collected Specimen Info Organism   08/02/24 Urine from Clean Catch/Voided Streptococcus agalactiae (Group B Streptococcus)        No further follow up needed from EDPD Team.     Alana Cruz, PharmD

## 2024-08-15 ENCOUNTER — OFFICE VISIT (OUTPATIENT)
Dept: PRIMARY CARE | Facility: CLINIC | Age: 79
End: 2024-08-15
Payer: MEDICARE

## 2024-08-15 VITALS
HEART RATE: 87 BPM | DIASTOLIC BLOOD PRESSURE: 78 MMHG | OXYGEN SATURATION: 98 % | WEIGHT: 138 LBS | TEMPERATURE: 98 F | BODY MASS INDEX: 25.4 KG/M2 | SYSTOLIC BLOOD PRESSURE: 150 MMHG | HEIGHT: 62 IN

## 2024-08-15 DIAGNOSIS — I10 BENIGN HYPERTENSION: ICD-10-CM

## 2024-08-15 DIAGNOSIS — M05.79 RHEUMATOID ARTHRITIS INVOLVING MULTIPLE SITES WITH POSITIVE RHEUMATOID FACTOR (MULTI): ICD-10-CM

## 2024-08-15 DIAGNOSIS — E03.9 HYPOTHYROIDISM (ACQUIRED): ICD-10-CM

## 2024-08-15 DIAGNOSIS — N18.31 STAGE 3A CHRONIC KIDNEY DISEASE (MULTI): ICD-10-CM

## 2024-08-15 DIAGNOSIS — M13.0 POLYARTHRITIS: ICD-10-CM

## 2024-08-15 DIAGNOSIS — E78.5 DYSLIPIDEMIA: Primary | ICD-10-CM

## 2024-08-15 PROCEDURE — G2211 COMPLEX E/M VISIT ADD ON: HCPCS | Performed by: INTERNAL MEDICINE

## 2024-08-15 PROCEDURE — 99214 OFFICE O/P EST MOD 30 MIN: CPT | Performed by: INTERNAL MEDICINE

## 2024-08-15 PROCEDURE — 1125F AMNT PAIN NOTED PAIN PRSNT: CPT | Performed by: INTERNAL MEDICINE

## 2024-08-15 PROCEDURE — 3078F DIAST BP <80 MM HG: CPT | Performed by: INTERNAL MEDICINE

## 2024-08-15 PROCEDURE — 3077F SYST BP >= 140 MM HG: CPT | Performed by: INTERNAL MEDICINE

## 2024-08-15 PROCEDURE — 1159F MED LIST DOCD IN RCRD: CPT | Performed by: INTERNAL MEDICINE

## 2024-08-15 PROCEDURE — 1157F ADVNC CARE PLAN IN RCRD: CPT | Performed by: INTERNAL MEDICINE

## 2024-08-15 RX ORDER — PREDNISONE 10 MG/1
TABLET ORAL
Qty: 42 TABLET | Refills: 0 | Status: SHIPPED | OUTPATIENT
Start: 2024-08-15 | End: 2024-08-27

## 2024-08-15 RX ORDER — LISINOPRIL AND HYDROCHLOROTHIAZIDE 12.5; 2 MG/1; MG/1
1 TABLET ORAL DAILY
Qty: 90 TABLET | Refills: 0 | Status: SHIPPED | OUTPATIENT
Start: 2024-08-15

## 2024-08-15 ASSESSMENT — PATIENT HEALTH QUESTIONNAIRE - PHQ9
SUM OF ALL RESPONSES TO PHQ9 QUESTIONS 1 AND 2: 0
2. FEELING DOWN, DEPRESSED OR HOPELESS: NOT AT ALL
1. LITTLE INTEREST OR PLEASURE IN DOING THINGS: NOT AT ALL

## 2024-08-15 ASSESSMENT — ENCOUNTER SYMPTOMS
LOSS OF SENSATION IN FEET: 0
OCCASIONAL FEELINGS OF UNSTEADINESS: 0
DEPRESSION: 0

## 2024-08-15 ASSESSMENT — PAIN SCALES - GENERAL: PAINLEVEL: 8

## 2024-08-15 NOTE — PROGRESS NOTES
"Subjective   Patient ID: Pat Delarosa is a 79 y.o. female who presents for UTI (Pain all over, frequent urination, fatigue/Unable to leave sample).    HPI     She went to ER with multiple joint aches, she was told she has UTI, was treated with antibiotics  Stated she had no improvement in her joint aches even after she was treated for UTI  She was given tramadol for pain, which helped as long as she took the prescription  Now the pain is back  Complaining of bilateral arm pain, and joints in hands hurt,, also has neck, and leg pain  She was on infusions previously, sees Dr Boggs, has not followed with her in last several months, as her infusion was changed to a different location       Review of Systems   Constitutional:  Negative for chills, fatigue and unexpected weight change.   HENT:  Negative for postnasal drip, sinus pressure and trouble swallowing.    Respiratory:  Negative for cough, shortness of breath and wheezing.    Cardiovascular:  Negative for chest pain, palpitations and leg swelling.   Gastrointestinal:  Negative for abdominal pain, blood in stool, nausea and vomiting.   Endocrine: Negative for polydipsia, polyphagia and polyuria.   Genitourinary:  Negative for dysuria and frequency.   Musculoskeletal:  Positive for arthralgias and back pain. Negative for myalgias.   Skin:  Negative for rash.   Neurological:  Negative for tremors, seizures and numbness.   Psychiatric/Behavioral:  Positive for behavioral problems. Negative for suicidal ideas.        Objective   /78 (BP Location: Left arm, Patient Position: Sitting, BP Cuff Size: Small adult)   Pulse 87   Temp 36.7 °C (98 °F)   Ht 1.575 m (5' 2\")   Wt 62.6 kg (138 lb)   SpO2 98%   BMI 25.24 kg/m²     Physical Exam  Constitutional:       General: She is not in acute distress.  HENT:      Head: Normocephalic and atraumatic.   Eyes:      Extraocular Movements: Extraocular movements intact.      Conjunctiva/sclera: Conjunctivae normal. "   Cardiovascular:      Rate and Rhythm: Normal rate and regular rhythm.      Pulses: Normal pulses.      Heart sounds: No murmur heard.  Pulmonary:      Effort: Pulmonary effort is normal.      Breath sounds: Normal breath sounds. No wheezing or rales.   Abdominal:      General: Bowel sounds are normal.      Palpations: Abdomen is soft. There is no mass.      Tenderness: There is no abdominal tenderness. There is no guarding.   Musculoskeletal:      Right lower leg: No edema.      Left lower leg: No edema.   Neurological:      General: No focal deficit present.      Mental Status: She is alert and oriented to person, place, and time.      Cranial Nerves: No cranial nerve deficit.      Gait: Gait normal.   Psychiatric:         Mood and Affect: Mood normal.         Assessment/Plan        Pat was seen today for uti.  Diagnoses and all orders for this visit:  Dyslipidemia (Primary)  Benign hypertension  -     lisinopriL-hydrochlorothiazide 20-12.5 mg tablet; Take 1 tablet by mouth once daily.  Polyarthritis  -     predniSONE (Deltasone) 10 mg tablet; Take 6 tablets (60 mg) by mouth once daily for 2 days, THEN 5 tablets (50 mg) once daily for 2 days, THEN 4 tablets (40 mg) once daily for 2 days, THEN 3 tablets (30 mg) once daily for 2 days, THEN 2 tablets (20 mg) once daily for 2 days, THEN 1 tablet (10 mg) once daily for 2 days.  Rheumatoid arthritis involving multiple sites with positive rheumatoid factor (Multi)  Stage 3a chronic kidney disease (Multi)  Hypothyroidism (acquired)    Advised follow-up with Dr. Boggs  Recommended compliance with antihypertensives, and low-salt diet  Follow-up in next few weeks with home blood pressure readings  Our goal blood pressure reading is less than 140/80  Reviewed ER records    Lab Results   Component Value Date    WBC 9.9 08/02/2024    HGB 14.0 08/02/2024    HCT 41.2 08/02/2024    MCV 92 08/02/2024     08/02/2024     Lab Results   Component Value Date    GLUCOSE 108  "(H) 08/02/2024    CALCIUM 9.8 08/02/2024     08/02/2024    K 3.5 08/02/2024    CO2 22 (L) 08/02/2024    CL 96 (L) 08/02/2024    BUN 26 (H) 08/02/2024    CREATININE 0.80 08/02/2024     Lab Results   Component Value Date    CHOL 171 05/03/2024    CHOL 195 06/07/2023    CHOL 176 08/29/2022     Lab Results   Component Value Date    HDL 81.0 05/03/2024     06/07/2023    HDL 95 08/29/2022     Lab Results   Component Value Date    LDLCALC 79 05/03/2024    LDLCALC 66 06/07/2023    LDLCALC 71 08/29/2022     Lab Results   Component Value Date    TRIG 53 05/03/2024    TRIG 75 06/07/2023    TRIG 51 08/29/2022     No components found for: \"CHOLHDL\"    "

## 2024-08-16 ASSESSMENT — ENCOUNTER SYMPTOMS
SINUS PRESSURE: 0
VOMITING: 0
NAUSEA: 0
CHILLS: 0
BLOOD IN STOOL: 0
POLYDIPSIA: 0
MYALGIAS: 0
SHORTNESS OF BREATH: 0
BACK PAIN: 1
WHEEZING: 0
TROUBLE SWALLOWING: 0
FREQUENCY: 0
DYSURIA: 0
COUGH: 0
UNEXPECTED WEIGHT CHANGE: 0
PALPITATIONS: 0
FATIGUE: 0
POLYPHAGIA: 0
NUMBNESS: 0
ABDOMINAL PAIN: 0
TREMORS: 0
ARTHRALGIAS: 1
SEIZURES: 0

## 2024-08-26 ENCOUNTER — OFFICE VISIT (OUTPATIENT)
Dept: PRIMARY CARE | Facility: CLINIC | Age: 79
End: 2024-08-26
Payer: MEDICARE

## 2024-08-26 DIAGNOSIS — M13.0 POLYARTHRITIS: Primary | ICD-10-CM

## 2024-08-26 DIAGNOSIS — I10 BENIGN HYPERTENSION: ICD-10-CM

## 2024-08-26 DIAGNOSIS — E78.5 DYSLIPIDEMIA: ICD-10-CM

## 2024-08-26 DIAGNOSIS — M05.79 RHEUMATOID ARTHRITIS INVOLVING MULTIPLE SITES WITH POSITIVE RHEUMATOID FACTOR (MULTI): ICD-10-CM

## 2024-08-26 PROCEDURE — 1036F TOBACCO NON-USER: CPT | Performed by: INTERNAL MEDICINE

## 2024-08-26 PROCEDURE — 3075F SYST BP GE 130 - 139MM HG: CPT | Performed by: INTERNAL MEDICINE

## 2024-08-26 PROCEDURE — 3078F DIAST BP <80 MM HG: CPT | Performed by: INTERNAL MEDICINE

## 2024-08-26 PROCEDURE — 1159F MED LIST DOCD IN RCRD: CPT | Performed by: INTERNAL MEDICINE

## 2024-08-26 PROCEDURE — 99214 OFFICE O/P EST MOD 30 MIN: CPT | Performed by: INTERNAL MEDICINE

## 2024-08-26 PROCEDURE — 1126F AMNT PAIN NOTED NONE PRSNT: CPT | Performed by: INTERNAL MEDICINE

## 2024-08-26 PROCEDURE — 1157F ADVNC CARE PLAN IN RCRD: CPT | Performed by: INTERNAL MEDICINE

## 2024-08-26 RX ORDER — DULOXETIN HYDROCHLORIDE 30 MG/1
30 CAPSULE, DELAYED RELEASE ORAL DAILY
Qty: 90 CAPSULE | Refills: 0 | Status: SHIPPED | OUTPATIENT
Start: 2024-08-26 | End: 2024-11-24

## 2024-08-26 RX ORDER — DULOXETIN HYDROCHLORIDE 30 MG/1
30 CAPSULE, DELAYED RELEASE ORAL DAILY
Qty: 90 CAPSULE | Refills: 0 | Status: SHIPPED | OUTPATIENT
Start: 2024-08-26 | End: 2024-08-26

## 2024-08-26 ASSESSMENT — ENCOUNTER SYMPTOMS
WHEEZING: 0
COUGH: 0
FREQUENCY: 0
FATIGUE: 0
SEIZURES: 0
NUMBNESS: 0
POLYDIPSIA: 0
DYSURIA: 0
ARTHRALGIAS: 1
TROUBLE SWALLOWING: 0
DEPRESSION: 0
BLOOD IN STOOL: 0
SHORTNESS OF BREATH: 0
UNEXPECTED WEIGHT CHANGE: 0
BACK PAIN: 1
SINUS PRESSURE: 0
NAUSEA: 0
VOMITING: 0
PALPITATIONS: 0
OCCASIONAL FEELINGS OF UNSTEADINESS: 0
MYALGIAS: 0
LOSS OF SENSATION IN FEET: 0
CHILLS: 0
ABDOMINAL PAIN: 0
TREMORS: 0
POLYPHAGIA: 0

## 2024-08-26 ASSESSMENT — PATIENT HEALTH QUESTIONNAIRE - PHQ9
2. FEELING DOWN, DEPRESSED OR HOPELESS: NOT AT ALL
1. LITTLE INTEREST OR PLEASURE IN DOING THINGS: NOT AT ALL
SUM OF ALL RESPONSES TO PHQ9 QUESTIONS 1 AND 2: 0

## 2024-08-26 ASSESSMENT — PAIN SCALES - GENERAL: PAINLEVEL: 0-NO PAIN

## 2024-08-26 NOTE — PROGRESS NOTES
Subjective   Patient ID: Pat Delarosa is a 79 y.o. female who presents for Follow-up (4mon follow up).    HPI   Has history of hypertension, rheumatoid arthritis, hypothyroidism    She went to ER with multiple joint aches, she was told she has UTI, was treated with antibiotics  Stated she had no improvement in her joint aches even after she was treated for UTI  She was given tramadol for pain, which helped as long as she took the prescription  Now the pain is back  Complaining of bilateral arm pain, and joints in hands hurt,, also has neck, and leg pain  She was on infusions previously, sees Dr Boggs, has not followed with her in last several months, as her infusion was changed to a different location   Since she took second dose of prednisone, joint aches much better  Has an appointment with her rheumatologist in 5 weeks    History of hypertension-during last visit, she realized she was not taking lisinopril hydrochlorothiazide, blood pressure was running high.  Restarted medication few weeks ago.  Denied any headache, chest pain, blurry vision  Review of Systems   Constitutional:  Negative for chills, fatigue and unexpected weight change.   HENT:  Negative for postnasal drip, sinus pressure and trouble swallowing.    Respiratory:  Negative for cough, shortness of breath and wheezing.    Cardiovascular:  Negative for chest pain, palpitations and leg swelling.   Gastrointestinal:  Negative for abdominal pain, blood in stool, nausea and vomiting.   Endocrine: Negative for polydipsia, polyphagia and polyuria.   Genitourinary:  Negative for dysuria and frequency.   Musculoskeletal:  Positive for arthralgias and back pain. Negative for myalgias.   Skin:  Negative for rash.   Neurological:  Negative for tremors, seizures and numbness.   Psychiatric/Behavioral:  Positive for behavioral problems. Negative for suicidal ideas.        Objective   /56 (BP Location: Right arm, Patient Position: Sitting, BP Cuff Size:  Adult)   Pulse 81   Temp 36.6 °C (97.9 °F) (Temporal)   Wt 57.6 kg (127 lb)   SpO2 97%   BMI 23.23 kg/m²     Physical Exam  Constitutional:       General: She is not in acute distress.  HENT:      Head: Normocephalic and atraumatic.   Eyes:      Extraocular Movements: Extraocular movements intact.      Conjunctiva/sclera: Conjunctivae normal.   Cardiovascular:      Rate and Rhythm: Normal rate and regular rhythm.      Pulses: Normal pulses.      Heart sounds: No murmur heard.  Pulmonary:      Effort: Pulmonary effort is normal.      Breath sounds: Normal breath sounds. No wheezing or rales.   Abdominal:      General: Bowel sounds are normal.      Palpations: Abdomen is soft. There is no mass.      Tenderness: There is no abdominal tenderness. There is no guarding.   Musculoskeletal:      Right lower leg: No edema.      Left lower leg: No edema.   Neurological:      General: No focal deficit present.      Mental Status: She is alert and oriented to person, place, and time.      Cranial Nerves: No cranial nerve deficit.      Gait: Gait normal.   Psychiatric:         Mood and Affect: Mood normal.         Assessment/Plan        Pat was seen today for follow-up.  Diagnoses and all orders for this visit:  Polyarthritis (Primary)  -     Discontinue: DULoxetine (Cymbalta) 30 mg DR capsule; Take 1 capsule (30 mg) by mouth once daily. Do not crush or chew.  -     DULoxetine (Cymbalta) 30 mg DR capsule; Take 1 capsule (30 mg) by mouth once daily. Do not crush or chew.  Rheumatoid arthritis involving multiple sites with positive rheumatoid factor (Multi)  Benign hypertension  Dyslipidemia      Started on Cymbalta for chronic pain, she has some underlying anxiety  Discussed medication side effects    Lab Results   Component Value Date    WBC 9.9 08/02/2024    HGB 14.0 08/02/2024    HCT 41.2 08/02/2024    MCV 92 08/02/2024     08/02/2024     Lab Results   Component Value Date    GLUCOSE 108 (H) 08/02/2024    CALCIUM  "9.8 08/02/2024     08/02/2024    K 3.5 08/02/2024    CO2 22 (L) 08/02/2024    CL 96 (L) 08/02/2024    BUN 26 (H) 08/02/2024    CREATININE 0.80 08/02/2024     Lab Results   Component Value Date    CHOL 171 05/03/2024    CHOL 195 06/07/2023    CHOL 176 08/29/2022     Lab Results   Component Value Date    HDL 81.0 05/03/2024     06/07/2023    HDL 95 08/29/2022     Lab Results   Component Value Date    LDLCALC 79 05/03/2024    LDLCALC 66 06/07/2023    LDLCALC 71 08/29/2022     Lab Results   Component Value Date    TRIG 53 05/03/2024    TRIG 75 06/07/2023    TRIG 51 08/29/2022     No components found for: \"CHOLHDL\"    "

## 2024-08-27 VITALS
DIASTOLIC BLOOD PRESSURE: 56 MMHG | SYSTOLIC BLOOD PRESSURE: 138 MMHG | WEIGHT: 127 LBS | OXYGEN SATURATION: 97 % | HEART RATE: 81 BPM | BODY MASS INDEX: 23.23 KG/M2 | TEMPERATURE: 97.9 F

## 2024-09-06 DIAGNOSIS — M05.79 RHEUMATOID ARTHRITIS INVOLVING MULTIPLE SITES WITH POSITIVE RHEUMATOID FACTOR (MULTI): ICD-10-CM

## 2024-09-06 NOTE — TELEPHONE ENCOUNTER
"PT' NIECE CALLED, STATES PT ARTHRITIS IS \"REALLY BAD\" ,  PT CAN BARELY WALK. SAW PCP & WAS GIVEN PREDNISONE & THEN SOMETHING ELSE & NOTHING IS HELPING. ASKING WHAT YOU CAN DO?   "

## 2024-09-09 RX ORDER — FOLIC ACID 1 MG/1
1 TABLET ORAL DAILY
COMMUNITY
End: 2024-09-09 | Stop reason: SDUPTHER

## 2024-09-10 RX ORDER — FOLIC ACID 1 MG/1
1 TABLET ORAL DAILY
Qty: 90 TABLET | Refills: 3 | Status: SHIPPED | OUTPATIENT
Start: 2024-09-10

## 2024-09-14 NOTE — TELEPHONE ENCOUNTER
Looks like she was recently seen by rheum for what sounds like PMR with resolution of symptoms and found to have +CCP per workup per note review.   Folic acid is ok- refilled.  She should clarify how she really is doing and who she will follow up with next steps. Thank you

## 2024-09-30 ENCOUNTER — APPOINTMENT (OUTPATIENT)
Dept: RHEUMATOLOGY | Facility: CLINIC | Age: 79
End: 2024-09-30
Payer: MEDICARE

## 2024-10-16 ENCOUNTER — APPOINTMENT (OUTPATIENT)
Dept: RHEUMATOLOGY | Facility: CLINIC | Age: 79
End: 2024-10-16
Payer: MEDICARE

## 2024-10-17 DIAGNOSIS — M81.0 OSTEOPOROSIS, UNSPECIFIED OSTEOPOROSIS TYPE, UNSPECIFIED PATHOLOGICAL FRACTURE PRESENCE: ICD-10-CM

## 2024-10-17 RX ORDER — ALENDRONATE SODIUM 70 MG/1
TABLET ORAL
Qty: 12 TABLET | Refills: 3 | Status: SHIPPED | OUTPATIENT
Start: 2024-10-17

## 2024-11-25 ENCOUNTER — OFFICE VISIT (OUTPATIENT)
Dept: RHEUMATOLOGY | Facility: CLINIC | Age: 79
End: 2024-11-25
Payer: MEDICARE

## 2024-11-25 VITALS
OXYGEN SATURATION: 98 % | HEART RATE: 96 BPM | SYSTOLIC BLOOD PRESSURE: 137 MMHG | DIASTOLIC BLOOD PRESSURE: 70 MMHG | WEIGHT: 132.2 LBS | BODY MASS INDEX: 24.18 KG/M2

## 2024-11-25 DIAGNOSIS — R76.8 ANTI-CYCLIC CITRULLINATED PEPTIDE ANTIBODY POSITIVE: ICD-10-CM

## 2024-11-25 DIAGNOSIS — M35.3 PMR (POLYMYALGIA RHEUMATICA) (MULTI): Primary | ICD-10-CM

## 2024-11-25 DIAGNOSIS — R29.898 WEAKNESS OF LEFT HIP: ICD-10-CM

## 2024-11-25 PROCEDURE — 1159F MED LIST DOCD IN RCRD: CPT | Performed by: INTERNAL MEDICINE

## 2024-11-25 PROCEDURE — 99215 OFFICE O/P EST HI 40 MIN: CPT | Performed by: INTERNAL MEDICINE

## 2024-11-25 PROCEDURE — 1036F TOBACCO NON-USER: CPT | Performed by: INTERNAL MEDICINE

## 2024-11-25 PROCEDURE — 99205 OFFICE O/P NEW HI 60 MIN: CPT | Performed by: INTERNAL MEDICINE

## 2024-11-25 PROCEDURE — 1157F ADVNC CARE PLAN IN RCRD: CPT | Performed by: INTERNAL MEDICINE

## 2024-11-25 PROCEDURE — 3078F DIAST BP <80 MM HG: CPT | Performed by: INTERNAL MEDICINE

## 2024-11-25 PROCEDURE — 3075F SYST BP GE 130 - 139MM HG: CPT | Performed by: INTERNAL MEDICINE

## 2024-11-25 RX ORDER — PREDNISONE 5 MG/1
TABLET ORAL
Qty: 165 TABLET | Refills: 0 | Status: SHIPPED | OUTPATIENT
Start: 2024-11-25 | End: 2025-01-29

## 2024-11-25 RX ORDER — PREDNISONE 20 MG/1
1 TABLET ORAL
COMMUNITY
Start: 2024-09-06 | End: 2024-11-25 | Stop reason: SDUPTHER

## 2024-11-25 ASSESSMENT — ENCOUNTER SYMPTOMS
DEPRESSION: 0
LOSS OF SENSATION IN FEET: 0
OCCASIONAL FEELINGS OF UNSTEADINESS: 1

## 2024-11-25 NOTE — PROGRESS NOTES
"  Subjective   Patient ID: [id number]   Pat Delarosa is a 79 y.o. female who presents to [facility] care    HPI  Recall from  and [facility] records 09/2024:  She was seeing Dr. Hansen x11 years for seropositive RA and was on HCQ.     Patient cannot recall how her symptoms started before diagnosis but thinks her arms and legs were sore and was then diagnosed with inflammatory arthritis and PMR 12/2019 by Dr. Boggs and started on prednisone.     She was treated with methotrexate and Enbrel initially which were ineffective and then was switched to Actemra. She is unsure how the effective the Actemra was \"I was still having weakness for my legs, they were working but I also think they weren't\" Her Actemra infusions were later switched to [facility] which was too far so she stopped taking the infusions.   She reports she was doing well off Actemra until August (all of a sudden she had severe pain in neck, shoulders and hip area associated with stiffness), getting out of bed was extremely a chore. She lost 15 lbs (140 to 125 lb) Went to her primary care doctor and they prescribed her prednisone 8/15/2024. 60 mg for 2 days, 50 mg for 2 days, 10 mg every 2 days. Her last dose was 8/27/2024. That significantly helped her symptoms. She reported recurrence of symptoms within 2 days of stopping the prednisone. She was then seen at [facility] rheumatology and restarted on prednisone 20mg for 6 weeks and her symptoms improved but she reported b/l thigh weakness and stiffness recurred when the prednisone was off (per notes she was supposed to taper it off, unclear if she did the taper, is forgetful) so it was increased again to 20mg.    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 This makes a   diagnosis of hereditary hemochromatosis very unlikely.     09/06/2024 at [facility]:  ESR and CRP normal   CCP 41, RF neg, mildy elevated free kappa w/o m protein    Current meds:  Prednisone 20mg daily for ~ 1 week  Fosamax weekly since 05/2021  Vitamin D 2000 international units daily     Prior meds:  Methotrexate  HCQ  Enbrel   Actemra       The b/l hip pain, buttock pain, shoulder and arm pain and stiffness has significantly improved (100%) since being restarted on the prednisone 20mg.   She still uses lidocaine patches on her lower back when she is out doing yard work, not present at rest.   She reports her legs feel weak when she is going up the stairs, going down the stairs is fine.   She is putting the lost weight back on.   Every once in a while she gets locking in the third and 4th digit with overuse (such as when she is doing cookies)   Specifically asked the patient about pain/swelling/ in MCPs and PIPs and wrists, knees, ankles and MTPs and she denied it even prior to being on prednisone     ROS  Constitutional: Denies fever, chills, weight loss, night sweats or headaches  Eyes: Denies dry eyes, blurry vision, redness or pain or photophobia  ENT: Denies dry mouth, dental loss, loss of taste, nasal or oral ulcers, Hx of difficulty swallowing that resolved  Cardiovascular: Denies chest pain  Respiratory: Denies shortness of breath, cough, asthma, or recurrent respiratory infections  Gastrointestinal: Denies nausea, vomiting, heartburn, abdominal pain, constipation, diarrhea, melena or hematochezia  Genitourinary: No recurrent urinary infections or STDs, no genital or anal ulcers.  Integumentary: Denies rash or lesions, Raynaud's phenomenon, skin tightening, digital ulcers, psoriatic lesions, or alopecia  Neurological: Denies any numbness or tingling  Hematologic/Lymphatic: Denies history of clots (arterial or venous), or  abortions/miscarriages/pregnancy complications   MSK: No joint pains, redness, hotness or swelling. No inflammatory back pain, enthesitis, dactylitis. No morning stiffness   Muscular: Denies difficulty rising from chair or combing the hair, muscle aches, or problems with hand      PMH/PSH: HTN, HyperPTH s/p parathyroidectomy, hypothyroidism, lumbar DDD, R sided macular degeneration, CKD?  Social: Nonsmoker, no alcohol, no drug use. Retired, was a , retired 10 years ago. Had 4 children but lost 2   FHx: No family history of autoimmune diseases       Patient Active Problem List   Diagnosis    Abnormal laboratory test result    Depression    Dyslipidemia    Benign hypertension    Gastroesophageal reflux disease    Hypercalcemia    Hyperglycemia    Hypothyroidism (acquired)    IgG deficiency (Multi)    Macrocytosis    Menopausal symptom    Osteoporosis    Rheumatoid arthritis involving multiple sites with positive rheumatoid factor (Multi)    Vitamin D deficiency    Age-related osteoporosis without current pathological fracture    Oropharyngeal dysphagia    Stage 3a chronic kidney disease (Multi)        Past Medical History:   Diagnosis Date    CKD (chronic kidney disease) stage 2, GFR 60-89 ml/min 09/03/2023        No past surgical history on file.     Social History     Socioeconomic History    Marital status:      Spouse name: Not on file    Number of children: Not on file    Years of education: Not on file    Highest education level: Not on file   Occupational History    Not on file   Tobacco Use    Smoking status: Former     Types: Cigarettes    Smokeless tobacco: Never   Vaping Use    Vaping status: Never Used   Substance and Sexual Activity    Alcohol use: Not Currently    Drug use: Never    Sexual activity: Defer   Other Topics Concern    Not on file   Social History Narrative    Not on file     Social Drivers of Health     Financial Resource Strain: Not on file   Food  Insecurity: Not on file   Transportation Needs: Not on file   Physical Activity: Not on file   Stress: Not on file   Social Connections: Not on file   Intimate Partner Violence: Not on file   Housing Stability: Not on file        No Known Allergies       Current Outpatient Medications:     alendronate (Fosamax) 70 mg tablet, TAKE 1 TABLET BY MOUTH WEEKLY  WITH 8 OZ OF PLAIN WATER 30  MINUTES BEFORE FIRST FOOD, DRINK OR MEDS. STAY UPRIGHT FOR 30  MINS, Disp: 12 tablet, Rfl: 3    amLODIPine (Norvasc) 10 mg tablet, TAKE 1 TABLET BY MOUTH ONCE  DAILY, Disp: 100 tablet, Rfl: 2    biotin 5 mg capsule, Take 1 capsule (5 mg) by mouth once daily., Disp: , Rfl:     DULoxetine (Cymbalta) 30 mg DR capsule, Take 1 capsule (30 mg) by mouth once daily. Do not crush or chew., Disp: 90 capsule, Rfl: 0    folic acid (Folvite) 1 mg tablet, Take 1 tablet (1 mg) by mouth once daily., Disp: 90 tablet, Rfl: 3    levothyroxine (Synthroid, Levoxyl) 50 mcg tablet, TAKE 1 TABLET BY MOUTH ON AN  EMPTY STOMACH ONCE DAILY IN THE  MORNING, Disp: 100 tablet, Rfl: 2    lisinopriL-hydrochlorothiazide 20-12.5 mg tablet, Take 1 tablet by mouth once daily., Disp: 90 tablet, Rfl: 0       Objective     Visit Vitals  /70   Pulse 96      Physical Exam  General: AAOx3, Cooperative  Head: normocephalic, atraumatic  Eyes: EOMI, conjunctiva clear, sclera white, anicteric  Throat/Mouth: No oral deformities, no cheek swelling, mucosa appear dry, no oral ulcers noted or loss of dentition   Neck/Lymph: FROM, trachea midline  Lungs: chest expansion symmetric. No respiratory distress.   Heart: RRR  Neuro: CN II-XII grossly intact, no focal deficit  L hip proximal weakness 3+/5, rest of muscle strength is 5/5 proximal and distal.  Skin: No rashes, ulcers or photosensitive areas  MSK: Upper Extremities:  Hand/Fingers: No erythema, swelling, tenderness or warmth at DIP, PIP, or MCP joints, FROM grossly. Good hand . No nodules. No deformities + OA  "changes  Wrists: No erythema, swelling, warmth or tenderness at wrist, FROM grossly  Elbows: No tenderness, swelling, erythema or warmth at elbows, FROM grossly. No nodules   Shoulders: No swelling, erythema, tenderness or warmth at shoulders. FROM  Lower Extremities:   Hips: No obvious deformities. No joint tenderness, normal ROM grossly. Log roll test negative bilaterally. Sourav test is negative bilaterally but with mild limitation. No trochanteric bursae TTP  Knees: No tenderness, deformities, swelling, rashes, or warmth, normal ROM grossly. No crepitus, no pes anserine bursa TTP   Ankles: No deformities, tenderness, edema, erythema, ulceration, or warmth at the ankle  Feet: Negative MTP squeeze. Normal ROM grossly.        Lab Results   Component Value Date    WBC 9.9 08/02/2024    HGB 14.0 08/02/2024    HCT 41.2 08/02/2024    MCV 92 08/02/2024     08/02/2024        Chemistry    Lab Results   Component Value Date/Time     08/02/2024 1127    K 3.5 08/02/2024 1127    CL 96 (L) 08/02/2024 1127    CO2 22 (L) 08/02/2024 1127    BUN 26 (H) 08/02/2024 1127    CREATININE 0.80 08/02/2024 1127    Lab Results   Component Value Date/Time    CALCIUM 9.8 08/02/2024 1127    ALKPHOS 86 08/02/2024 1127    AST 33 08/02/2024 1127    ALT 27 08/02/2024 1127    BILITOT 0.5 08/02/2024 1127           Lab Results   Component Value Date    CRP 0.3 12/06/2022      Lab Results   Component Value Date    SEDRATE 2 12/06/2022      Lab Results   Component Value Date    CKTOTAL 140 12/06/2022     Lab Results   Component Value Date    NEUTROABS 7.46 (H) 08/02/2024      No results found for: \"FERRITIN\"   Lab Results   Component Value Date    HEPCAB  12/03/2019     Negative  Reference range: NEGATIVE  Performed at the [facility] unless   otherwise noted.        Lab Results   Component Value Date    ALT 27 08/02/2024    AST 33 08/02/2024    ALKPHOS 86 08/02/2024    BILITOT 0.5 08/02/2024      No results found " "for: \"PPD\"   Lab Results   Component Value Date    URICACID 7.6 (H) 12/03/2019      Lab Results   Component Value Date    PTH 68 (H) 07/07/2023    CALCIUM 9.8 08/02/2024    CAION 1.25 04/09/2020    PHOS 3.5 01/03/2020      Lab Results   Component Value Date    SPEP  12/03/2019     (Note)    No definitive M protein is identified on protein   electrophoresis.        No results found for: \"ALBUR\", \"[id number]\"     XR hips, lumbar spine at [facility] 09/2024  Lumbar spine:   Counting reference:  Lumbosacral junction.  For the purposes of this   report,  L4-5 is considered the level of the iliac crest and assume there   are 5 lumbar-type vertebrae.  Anatomic variant:  None.     The vertebral body heights are preserved.  Dextroscoliosis centered at   L2-L3 is present.  Multilevel degenerative disc disease with endplate   marginal osteophytes and intervertebral disc space narrowing most   prominently at L2-L3 which is severe with vacuum disc phenomenon in the   anterior aspect of the intervertebral disc space, mild otherwise in the   remaining levels.  Mild retrolisthesis of L2 on L3 and L3 on L4 is   present.  Moderate facet arthropathy in the lower lumbar spine is   present, most prominently at L4-5 and L5-S1. Atherosclerotic   calcification is noted in the aorta.     Sacroiliac joints:   Symmetric mild degenerative changes in the bilateral sacroiliac joints   are noted.  No abnormal widening, cortical indistinctness, or erosive   changes is seen to suggest sacroiliitis.     Hips:   Symmetric axial joint space narrowing, mild, with associated mild   marginal osteophyte formation is seen bilaterally.  No juxta-articular   bony erosive changes are noted. The symphysis pubis demonstrates mild   degenerative changes.  There is chondrocalcinosis at the symphysis pubis.    There is no acute fracture or dislocation.  Incidental note is made of   vascular calcifications in the femoral arteries bilaterally.     Hand XR 09/2024 at " [facility]  RESULT:     Note is made of proximal interphalangeal and distal interphalangeal joint   degenerative changes which are overall mild.   Moderate second   metacarpophalangeal and third metacarpophalangeal joint space narrowing   on the RIGHT side is present.  Joint capsule calcification is seen in the   LEFT second and RIGHT second and third metacarpophalangeal joint   capsules.  Triangular fibrocartilage chondrocalcinosis is present   bilaterally.  No juxta-articular bony erosive changes are noted.     FL modified barium swallow study  Narrative: Interpreted By:  Fede Hartley and Schmitz Kimberly   STUDY:  FL MODIFIED BARIUM SWALLOW STUDY;; 2/13/2024 1:36 pm      INDICATION:  Signs/Symptoms:dysphagia.      COMPARISON:  None.      ACCESSION NUMBER(S):  [id number]      ORDERING CLINICIAN:  GILDARDO WOO      TECHNIQUE:  MBSS completed. Informed verbal consent obtained prior to completion  of exam. Trials of thin (5mL via spoon, 20mL via cup, 60mL via  sequential straw sips), nectar (5mL via spoon, 20mL via cup),  pudding, regular solids, and a barium tablet were provided.      Total fluoroscopy time:  2 minutes 20 seconds  Radiation exposure (Reference Air Kerma):  29.45 mGy  Images:  14 cine          SLP: Renay Negron CCC-SLP  Phone/Pager: Available via secure chat      SPEECH FINDINGS:  Reason for referral: Pt endorses dysphagia to solids and pills  characterized by sensation of retention at or just below the level of  the sternal notch. This has been occurring intermittently for the  past several weeks. She reports one instance of needing to cough the  pill up and reswallow, but is often able to clear the retention with  liquid sips. Pt reports that she feels her throat is very dry despite  drinking a lot of water. Patient hx: Depression, dyslipidemia, benign  hypertension, hypothyroidism osteoporosis, rheumatoid arthritis.  Respiratory status: WFL Previous diet: Regular/thin      FINAL SPEECH  RECOMMENDATIONS      Diet recommendations/feeding strategies: Continue regular texture  solids and thin liquids. Trial moistening mouth/throat with Biotene  or other moisturizing agent prior to taking pills. Trial taking pills  in puree carrier such as applesauce or yogurt. Chew thoroughly. Add  extra moisture to solids a needed.      Follow-up speech therapy recommended: No.      Education provided: Yes. Pt was educated re: general results of study  and recommended safe swallow strategies.      Treatment Provided today: No.      Additional consult suggested: N/A      Repeat study/ dc plan: No repeat MBSS recommended at this time.  Repeat as clinically warranted if oropharyngeal dysphagia symptoms  worsen.      Mechanics of the swallow summary:  *Oral phase: Natural dentition present, missing few. Labial seal was  adequate. Mastication of solids was mildly disorganized and labored,  but functional. A/P transit was sluggish. Pt used piecemeal  deglutition. No significant premature spillage into the pharynx prior  to the swallow. No significant oral residuals were seen. *Pharyngeal  phase: Anatomy/positioning: Bony prominences seen at C5-C7.  Timing: Swallow onset was severely delayed for thin and mildly thick  liquids, with bolus head in the pyriform sinuses prior to onset of  pharyngeal movement. Strength/ROM: Tongue base retraction was present  but mildly reduced. Pharyngeal stripping wave was adequate.  Hyolaryngeal elevation/excursion were mildly diminished. Epiglottic  inversion was complete. UES opening was completed, however prominence  of the cricopharyngeus created an impression on the bolus during the  swallow. This did not appear to impede bolus clearance during study.  Residue: Trace-min residue in the valleculae and trace residue on the  posterior pharyngeal wall/pyriform sinuses was seen after liquids and  solids. Pt cleared this residue independently with dry swallows or  with subsequent boluses.  Additionally, the tablet was retained in the  pharyngeal space for 4-5 seconds, and cleared with additional  swallows of water. Airway protection: Laryngeal vestibular closure  was achieved inconsistently, as evidenced by intermittent column of  air between structures during the swallow. Penetration of thin  liquids was seen during the swallow x1, with no visible laryngeal  residuals after the swallow. No aspiration was seen. *Esophageal  phase: An esophageal sweep was completed in the A/P view with 20mL of  thin, 5mL of pudding, and a barium tablet given in thin water. Brief  retention of the tablet near the level of the LES which cleared with  an additional swallow of water. Liquid and pudding cleared promptly  through the esophagus and into the stomach.      SLP impressions with severity rating: Min oropharyngeal dysphagia  characterized by mildly reduced movement of some oral/pharyngeal  structures resulting in occasional penetration without aspiration and  trace-min pharyngeal residue. This could be secondary to mild  weakness vs pt's reported throat dryness.      Thin Liquids (MBSS)  Rosenbek's Penetration Aspiration Scale, Thin Liquids (MBSS):  2. PENETRATION that CLEARS - contrast enter airway, above vocal  cords, no residue          Nectar Thick Liquids (MBSS)  Rosenbek's Penetration Aspiration Scale, Nectar thick liquids (MBSS):  1. NO ASPIRATION & NO PENETRATION - no aspiration, contrast does  not enter airway          Honey Thick Liquids (MBSS)  Rosenbek's Penetration Aspiration Scale, Honey thick liquids (MBSS):  1. NO ASPIRATION & NO PENETRATION - no aspiration, contrast does  not enter airway          Purees (MBSS)  Rosenbek's Penetration Aspiration Scale, Purees (MBSS):  1. NO ASPIRATION & NO PENETRATION - no aspiration, contrast does  not enter airway          Solids (MBSS)  Rosenbek's Penetration Aspiration Scale, Solids (MBSS):  1. NO ASPIRATION & NO PENETRATION - no aspiration, contrast does  not  enter airway          Speech Therapy section of this report signed by Renay Negron CCC-SLP  on 2/13/2024 at 2:13 pm.      RADIOLOGY FINDINGS:  There is laryngeal penetration without aspiration with thin liquid  barium      Radiology section of this report signed by Naeem.      Impression: Laryngeal penetration without aspiration with thin liquid barium.  Please see speech pathology section above for further details      MACRO:  None      Signed by: Fede Hartley 2/13/2024 5:32 PM  Dictation workstation:   [id number]     DEXA 01/2024  FINDINGS:  SPINE L1-L4  Bone Mineral Density: 1.392 g/cm2  T-Score 3.1  Z-Score 5.7  Bone Mineral Density change vs baseline:  16.9 %  Bone Mineral Density change vs previous: 9.9 %      LEFT FEMUR -TOTAL  Bone Mineral Density: 0.836 g/cm2  T-Score -0.9   Z-Score  1.1  Bone Mineral Density change vs baseline: -9.8 %  Bone Mineral Density change vs previous: -7.5 %      LEFT FEMUR -NECK  Bone Mineral Density: 0.673 g/cm2  T-Score -1.6  Z-Score 0.7    DEXA 04/2021  IMPRESSION:     BMD of lumbar spine is in the normal range with T-score of 2.0.  Significant interval increase in bone mineral density since the baseline by  6.3%. No significant change since the previous.  BMD of the LEFT total hip is in the normal range with T score of -0.3.   No  significant changes in bone marrow density.  BMD of the femoral neck is in osteopenic range with T-score of -1.6 .  BMD of the left forearm is in the normal range with T score of 0.2.  BMD of the left forearm UD is in the normal range with T score of -0.1.    XR ankle right 2016  IMPRESSION:     BMD of lumbar spine is in the normal range with T-score of 2.0.  Significant interval increase in bone mineral density since the baseline by  6.3%. No significant change since the previous.  BMD of the LEFT total hip is in the normal range with T score of -0.3.   No  significant changes in bone marrow density.  BMD of the femoral neck is in  osteopenic range with T-score of -1.6 .  BMD of the left forearm is in the normal range with T score of 0.2.  BMD of the left forearm UD is in the normal range with T score of -0.1.    Assessment/Plan    A 79 year old F here to establish care.     She has a previous diagnosis of RA and PMR. She has some difficulty providing a detailed medical history but Dr. Boggs's office notes, labs from [facility] were also reviewed.     Today she reports a classic PMR picture with pelvic and shoulder girdle pain/stiffness and geling phenomenon that significantly resolved with prednisone with recurrence in her symptoms once prednisone was discontinued.     She currently reports 100% resolution in pelvic and shoulder girdle symptoms while on 20mg of prednisone.     She also has a positive ACPA from [facility] records, but reports no episodes of pain or swelling in the peripheral joints. Physical exam without evidence of peripheral synovitis but while the patient is on 20mg of prednisone so this will need to be monitored closely while tapering down prednisone.   On review of prior imaging performed at [facility], she has chondrocalcinosis and moderate narrowing of 2nd and 3rd MCP, chondrocalcinosis at symphysis pubis and hip osteoarthritis.   PTH is normal, magnesium and phosphorus are normal. She is heterozygous carrier of the H63D variant, making a diagnosis of hemochromatosis unlikley.     She has unilateral left proximal LE weakness, suspect component of lumbar DDD, rest of muscle strength is normal. CPK and aldolase are normal. Will obtain of LLE and MRI L hip.     Impression: PMR  Also concern for inflammatory arthritis with positive ACPA, chondrocalcinosis     - Labs today  - L hip MRI and EMG   - Continue prednisone 20mg for another week then 15mg for 2 weeks then 12.5mg for 2 weeks then 10mg daily till follow up.   - Discussed steroid sparing options including sarilumab, methotrexate (GFR is 56). Patient would like to avoid infusions due to  not being able to make the infusion center drives. This will be discussed further at follow up .  - She will need SPEP periodically, has a previous mildy elevated free kappa w/o m protein  - Repeat labs to be done prior to follow up     Osteopenia with high FRAX  - continue fosamax and vitamin D     RTC in 4-6 weeks for follow up    Sang More MD  Division of Rheumatology   [facility]

## 2024-11-25 NOTE — PATIENT INSTRUCTIONS
Please get labs done today and again before our next appointment    Prednisone 20mg for 7 days then 15mg for 2 weeks then 12.5mg for 2 weeks then 10mg daily till I see you next       Methotrexate (Rheumatrex, Trexall, Otrexup, Rasuvo)  Methotrexate is one of the most effective and commonly used medications in the treatment of rheumatoid arthritis and other forms of inflammatory arthritis. It may also be used to treat lupus, inflammatory myositis, vasculitis, psoriasis, scleroderma, and some forms of childhood arthritis. It is known as a disease-modifying anti-rheumatic drug (DMARD), because it not only decreases the pain and swelling of arthritis, but it also can decrease damage to joints and long-term disability.    How To Take It  Methotrexate comes either as pills or as a subcutaneous injection. Methotrexate is usually taken as a single dose once per week, although occasionally the dose is split into two to improve absorption or avoid side effects. Your doctor also may prescribe a folic acid (or folate) supplement to decrease the chance of side effects. Alcohol should be avoided as it can significantly increase the risk for liver damage while taking methotrexate. Contraception is highly encouraged while on Methotrexate as this medication can cause fetal abnormalities. Regular laboratory monitoring is required while taking methotrexate. Symptomatic improvement can be noted within three to six weeks of initiating therapy, but full benefit of this drug may take up to 12 weeks.    Side Effects  Methotrexate can lower the ability of your immune system to fight infections. If you develop an infection while using this medication, you should stop it and contact your doctor. The most common side effects are gastrointestinal upset and elevations of liver function tests.    The most common side effects are nausea and diarrhea. Mouth sores, rash, dizziness, and abnormalities in blood counts can also be seen but are less  common.    Methotrexate rarely can cause liver issues, most likely to occur in patients who already have liver problems or are using alcohol or taking other drugs that are toxic to the liver. Slow hair loss is seen in some patients but reverses once medication is stopped. This can often be managed by taking folic acid. It is important to remember that most patients do not experience side effects.    Tell Your Rheumatology Provider  You should contact your rheumatology provider if you develop symptoms of an infection, such as a fever or cough, or if you think you are having any side effects. Be sure to let your rheumatology provider know if you are pregnant, planning to get pregnant, or if you are breastfeeding. Methotrexate treatment should be discontinued for at least three months before attempting to become pregnant. Men taking methotrexate should talk to their physician prior to attempts to conceive. If you are planning on having surgery or will be receiving chemotherapy or radiation therapy, talk to your rheumatology provider first.    Updated February 2024 by Melba Kern MD, and reviewed by the [employer] Committee on Communications and Marketing.    This patient fact sheet is provided for general education only. Individuals should consult a qualified health care provider for professional medical advice, diagnosis, and treatment of a medical or health condition.

## 2024-11-26 ENCOUNTER — LAB (OUTPATIENT)
Dept: LAB | Facility: LAB | Age: 79
End: 2024-11-26
Payer: MEDICARE

## 2024-11-26 ENCOUNTER — OFFICE VISIT (OUTPATIENT)
Dept: PRIMARY CARE | Facility: CLINIC | Age: 79
End: 2024-11-26
Payer: MEDICARE

## 2024-11-26 VITALS
BODY MASS INDEX: 23.23 KG/M2 | OXYGEN SATURATION: 99 % | WEIGHT: 127 LBS | HEART RATE: 92 BPM | TEMPERATURE: 96.7 F | DIASTOLIC BLOOD PRESSURE: 66 MMHG | SYSTOLIC BLOOD PRESSURE: 132 MMHG

## 2024-11-26 DIAGNOSIS — M05.79 RHEUMATOID ARTHRITIS INVOLVING MULTIPLE SITES WITH POSITIVE RHEUMATOID FACTOR (MULTI): Primary | ICD-10-CM

## 2024-11-26 DIAGNOSIS — H35.3211 EXUDATIVE AGE-RELATED MACULAR DEGENERATION, RIGHT EYE, WITH ACTIVE CHOROIDAL NEOVASCULARIZATION: ICD-10-CM

## 2024-11-26 DIAGNOSIS — E03.9 HYPOTHYROIDISM (ACQUIRED): ICD-10-CM

## 2024-11-26 DIAGNOSIS — E89.2 POSTPROCEDURAL HYPOPARATHYROIDISM (MULTI): ICD-10-CM

## 2024-11-26 DIAGNOSIS — I73.9 PERIPHERAL VASCULAR DISEASE, UNSPECIFIED (CMS-HCC): ICD-10-CM

## 2024-11-26 DIAGNOSIS — M35.3 PMR (POLYMYALGIA RHEUMATICA) (MULTI): ICD-10-CM

## 2024-11-26 DIAGNOSIS — R76.8 ANTI-CYCLIC CITRULLINATED PEPTIDE ANTIBODY POSITIVE: ICD-10-CM

## 2024-11-26 DIAGNOSIS — I10 BENIGN HYPERTENSION: ICD-10-CM

## 2024-11-26 LAB
ALBUMIN SERPL BCP-MCNC: 4.5 G/DL (ref 3.4–5)
ALP SERPL-CCNC: 61 U/L (ref 33–136)
ALT SERPL W P-5'-P-CCNC: 30 U/L (ref 7–45)
ANION GAP SERPL CALCULATED.3IONS-SCNC: 18 MMOL/L (ref 10–20)
AST SERPL W P-5'-P-CCNC: 30 U/L (ref 9–39)
BASOPHILS # BLD AUTO: 0.05 X10*3/UL (ref 0–0.1)
BASOPHILS NFR BLD AUTO: 0.3 %
BILIRUB SERPL-MCNC: 0.7 MG/DL (ref 0–1.2)
BUN SERPL-MCNC: 24 MG/DL (ref 6–23)
CALCIUM SERPL-MCNC: 10.2 MG/DL (ref 8.6–10.3)
CCP IGG SERPL-ACNC: <1 U/ML
CHLORIDE SERPL-SCNC: 97 MMOL/L (ref 98–107)
CO2 SERPL-SCNC: 28 MMOL/L (ref 21–32)
CREAT SERPL-MCNC: 1.21 MG/DL (ref 0.5–1.05)
CRP SERPL-MCNC: 0.24 MG/DL
EGFRCR SERPLBLD CKD-EPI 2021: 46 ML/MIN/1.73M*2
EOSINOPHIL # BLD AUTO: 0 X10*3/UL (ref 0–0.4)
EOSINOPHIL NFR BLD AUTO: 0 %
ERYTHROCYTE [DISTWIDTH] IN BLOOD BY AUTOMATED COUNT: 14.3 % (ref 11.5–14.5)
ERYTHROCYTE [SEDIMENTATION RATE] IN BLOOD BY WESTERGREN METHOD: 6 MM/H (ref 0–30)
GLUCOSE SERPL-MCNC: 125 MG/DL (ref 74–99)
HCT VFR BLD AUTO: 41.4 % (ref 36–46)
HGB BLD-MCNC: 14.3 G/DL (ref 12–16)
IMM GRANULOCYTES # BLD AUTO: 0.2 X10*3/UL (ref 0–0.5)
IMM GRANULOCYTES NFR BLD AUTO: 1.2 % (ref 0–0.9)
LYMPHOCYTES # BLD AUTO: 1.72 X10*3/UL (ref 0.8–3)
LYMPHOCYTES NFR BLD AUTO: 10.6 %
MCH RBC QN AUTO: 32.8 PG (ref 26–34)
MCHC RBC AUTO-ENTMCNC: 34.5 G/DL (ref 32–36)
MCV RBC AUTO: 95 FL (ref 80–100)
MONOCYTES # BLD AUTO: 0.41 X10*3/UL (ref 0.05–0.8)
MONOCYTES NFR BLD AUTO: 2.5 %
NEUTROPHILS # BLD AUTO: 13.92 X10*3/UL (ref 1.6–5.5)
NEUTROPHILS NFR BLD AUTO: 85.4 %
NRBC BLD-RTO: 0 /100 WBCS (ref 0–0)
PLATELET # BLD AUTO: 324 X10*3/UL (ref 150–450)
POTASSIUM SERPL-SCNC: 4.9 MMOL/L (ref 3.5–5.3)
PROT SERPL-MCNC: 7.3 G/DL (ref 6.4–8.2)
RBC # BLD AUTO: 4.36 X10*6/UL (ref 4–5.2)
RHEUMATOID FACT SER NEPH-ACNC: 27 IU/ML (ref 0–15)
SODIUM SERPL-SCNC: 138 MMOL/L (ref 136–145)
WBC # BLD AUTO: 16.3 X10*3/UL (ref 4.4–11.3)

## 2024-11-26 PROCEDURE — 1157F ADVNC CARE PLAN IN RCRD: CPT | Performed by: INTERNAL MEDICINE

## 2024-11-26 PROCEDURE — 3075F SYST BP GE 130 - 139MM HG: CPT | Performed by: INTERNAL MEDICINE

## 2024-11-26 PROCEDURE — 85025 COMPLETE CBC W/AUTO DIFF WBC: CPT

## 2024-11-26 PROCEDURE — 99213 OFFICE O/P EST LOW 20 MIN: CPT | Performed by: INTERNAL MEDICINE

## 2024-11-26 PROCEDURE — 80053 COMPREHEN METABOLIC PANEL: CPT

## 2024-11-26 PROCEDURE — 1036F TOBACCO NON-USER: CPT | Performed by: INTERNAL MEDICINE

## 2024-11-26 PROCEDURE — G2211 COMPLEX E/M VISIT ADD ON: HCPCS | Performed by: INTERNAL MEDICINE

## 2024-11-26 PROCEDURE — 86200 CCP ANTIBODY: CPT

## 2024-11-26 PROCEDURE — 86481 TB AG RESPONSE T-CELL SUSP: CPT

## 2024-11-26 PROCEDURE — 1159F MED LIST DOCD IN RCRD: CPT | Performed by: INTERNAL MEDICINE

## 2024-11-26 PROCEDURE — 86431 RHEUMATOID FACTOR QUANT: CPT

## 2024-11-26 PROCEDURE — 3078F DIAST BP <80 MM HG: CPT | Performed by: INTERNAL MEDICINE

## 2024-11-26 PROCEDURE — 86140 C-REACTIVE PROTEIN: CPT

## 2024-11-26 PROCEDURE — 85652 RBC SED RATE AUTOMATED: CPT

## 2024-11-26 PROCEDURE — 36415 COLL VENOUS BLD VENIPUNCTURE: CPT

## 2024-11-26 PROCEDURE — 1126F AMNT PAIN NOTED NONE PRSNT: CPT | Performed by: INTERNAL MEDICINE

## 2024-11-26 RX ORDER — LISINOPRIL AND HYDROCHLOROTHIAZIDE 12.5; 2 MG/1; MG/1
1 TABLET ORAL DAILY
Qty: 90 TABLET | Refills: 2 | Status: SHIPPED | OUTPATIENT
Start: 2024-11-26

## 2024-11-26 ASSESSMENT — ENCOUNTER SYMPTOMS
BACK PAIN: 1
ABDOMINAL PAIN: 0
SEIZURES: 0
DYSURIA: 0
WHEEZING: 0
SINUS PRESSURE: 0
CHILLS: 0
PALPITATIONS: 0
BLOOD IN STOOL: 0
COUGH: 0
ARTHRALGIAS: 1
UNEXPECTED WEIGHT CHANGE: 0
FREQUENCY: 0
FATIGUE: 0
TREMORS: 0
LOSS OF SENSATION IN FEET: 0
VOMITING: 0
OCCASIONAL FEELINGS OF UNSTEADINESS: 0
SHORTNESS OF BREATH: 0
NUMBNESS: 0
TROUBLE SWALLOWING: 0
NAUSEA: 0
DEPRESSION: 0
MYALGIAS: 0

## 2024-11-26 ASSESSMENT — PAIN SCALES - GENERAL: PAINLEVEL_OUTOF10: 0-NO PAIN

## 2024-11-26 NOTE — PROGRESS NOTES
Subjective   Patient ID: Pat Delarosa is a 79 y.o. female who presents for Follow-up (3mon follow up).    HPI   Has history of hypertension, rheumatoid arthritis, hypothyroidism        History of hypertension-compliant with medications, denied any chest pain, pedal edema    Seeing rheumatology, diagnosed as PMR  On long taper of prednisone  Stated joint aches much better, feeling back to herself    Refsued mammogram this year    Review of Systems   Constitutional:  Negative for chills, fatigue and unexpected weight change.   HENT:  Negative for postnasal drip, sinus pressure and trouble swallowing.    Respiratory:  Negative for cough, shortness of breath and wheezing.    Cardiovascular:  Negative for chest pain, palpitations and leg swelling.   Gastrointestinal:  Negative for abdominal pain, blood in stool, nausea and vomiting.   Genitourinary:  Negative for dysuria and frequency.   Musculoskeletal:  Positive for arthralgias and back pain. Negative for myalgias.   Skin:  Negative for rash.   Neurological:  Negative for tremors, seizures and numbness.   Psychiatric/Behavioral:  Negative for behavioral problems and suicidal ideas.        Objective   /66 (BP Location: Left arm, Patient Position: Sitting, BP Cuff Size: Adult)   Pulse 92   Temp 35.9 °C (96.7 °F) (Temporal)   Wt 57.6 kg (127 lb)   SpO2 99%   BMI 23.23 kg/m²     Physical Exam  Constitutional:       General: She is not in acute distress.  HENT:      Head: Normocephalic and atraumatic.   Eyes:      Extraocular Movements: Extraocular movements intact.      Conjunctiva/sclera: Conjunctivae normal.   Cardiovascular:      Rate and Rhythm: Normal rate and regular rhythm.      Pulses: Normal pulses.      Heart sounds: No murmur heard.  Pulmonary:      Effort: Pulmonary effort is normal.      Breath sounds: Normal breath sounds. No wheezing or rales.   Abdominal:      General: Bowel sounds are normal.      Palpations: Abdomen is soft. There is no mass.       Tenderness: There is no abdominal tenderness. There is no guarding.   Musculoskeletal:      Right lower leg: No edema.      Left lower leg: No edema.   Neurological:      General: No focal deficit present.      Mental Status: She is alert and oriented to person, place, and time.      Cranial Nerves: No cranial nerve deficit.      Gait: Gait normal.   Psychiatric:         Mood and Affect: Mood normal.         Assessment/Plan        Pat was seen today for follow-up.  Diagnoses and all orders for this visit:  Rheumatoid arthritis involving multiple sites with positive rheumatoid factor (Multi) (Primary)  Benign hypertension  -     lisinopriL-hydrochlorothiazide 20-12.5 mg tablet; Take 1 tablet by mouth once daily.  Hypothyroidism (acquired)  Comments:  Continue levothyroxine, TSH normal  Exudative age-related macular degeneration, right eye, with active choroidal neovascularization  Peripheral vascular disease, unspecified (CMS-HCC)  Postprocedural hypoparathyroidism (Multi)    Reviewed recent labs, and rheumatology notes      Lab Results   Component Value Date    WBC 16.3 (H) 11/26/2024    HGB 14.3 11/26/2024    HCT 41.4 11/26/2024    MCV 95 11/26/2024     11/26/2024     Lab Results   Component Value Date    GLUCOSE 108 (H) 08/02/2024    CALCIUM 9.8 08/02/2024     08/02/2024    K 3.5 08/02/2024    CO2 22 (L) 08/02/2024    CL 96 (L) 08/02/2024    BUN 26 (H) 08/02/2024    CREATININE 0.80 08/02/2024     Lab Results   Component Value Date    TSH 0.94 05/03/2024       Lab Results   Component Value Date    WBC 16.3 (H) 11/26/2024    HGB 14.3 11/26/2024    HCT 41.4 11/26/2024    MCV 95 11/26/2024     11/26/2024     Lab Results   Component Value Date    GLUCOSE 108 (H) 08/02/2024    CALCIUM 9.8 08/02/2024     08/02/2024    K 3.5 08/02/2024    CO2 22 (L) 08/02/2024    CL 96 (L) 08/02/2024    BUN 26 (H) 08/02/2024    CREATININE 0.80 08/02/2024     Lab Results   Component Value Date    CHOL 171  "05/03/2024    CHOL 195 06/07/2023    CHOL 176 08/29/2022     Lab Results   Component Value Date    HDL 81.0 05/03/2024     06/07/2023    HDL 95 08/29/2022     Lab Results   Component Value Date    LDLCALC 79 05/03/2024    LDLCALC 66 06/07/2023    LDLCALC 71 08/29/2022     Lab Results   Component Value Date    TRIG 53 05/03/2024    TRIG 75 06/07/2023    TRIG 51 08/29/2022     No components found for: \"CHOLHDL\"      XR HIP BILATERAL 5V PEL/AP/LAT EACH HIP  Order: [id number]  Impression    IMPRESSION:    No radiographic findings of inflammatory arthropathy    Osteoarthritis of the hips bilaterally    Mild degenerative arthritis of the sacroiliac joints    Moderate lumbar spondylosis most prominently at the L2-L3 level.          : REED    Transcribe Date/Time: Sep  8 2024  4:00P    Dictated by : DAMION STEWART MD    This examination was interpreted and the report reviewed and  electronically signed by:  DAMION STEWART MD on Sep  8 2024  4:03PM  EST  Narrative    * * *Final Report* * *    DATE OF EXAM: Sep  7 2024  8:49AM      [facility]   5353  -  XR HIP DIANE 5V PEL+ AP/LAT EA HIP  / ACCESSION #  [id number]    PROCEDURE REASON: Polymyalgia rheumatica (HCC)        * * * * Physician Interpretation * * * *     REASON FOR STUDY:  Polymyalgia rheumatica (HCC)   .    PATIENT/TECHNOLOGIST-PROVIDED HISTORY:  RHEUMATOID ARTHRITIS      TECHNIQUE: XR LUMBAR 3V AP/LAT/L5-S1, XR SI JTS 2V AP PELV/ANTHONY, XR  HIP DIANE 5V PEL+ AP/LAT EA HIP     Laterality:  NOT APPLICABLE (accession [id number]), NOT APPLICABLE  (accession [id number]), BILATERAL (accession [id number])     Number of different views (projections): 3 (accession [id number]), 2  (accession [id number])    COMPARISON: None    RESULT:    Lumbar spine:    Counting reference:  Lumbosacral junction.  For the purposes of this  report,  L4-5 is considered the level of the iliac crest and assume there  are 5 lumbar-type vertebrae.  Anatomic variant:  None.    The vertebral body " heights are preserved.  Dextroscoliosis centered at   L2-L3 is present.  Multilevel degenerative disc disease with endplate  marginal osteophytes and intervertebral disc space narrowing most  prominently at L2-L3 which is severe with vacuum disc phenomenon in the  anterior aspect of the intervertebral disc space, mild otherwise in the  remaining levels.  Mild retrolisthesis of L2 on L3 and L3 on L4 is  present.  Moderate facet arthropathy in the lower lumbar spine is  present, most prominently at L4-5 and L5-S1. Atherosclerotic  calcification is noted in the aorta.    Sacroiliac joints:    Symmetric mild degenerative changes in the bilateral sacroiliac joints  are noted.  No abnormal widening, cortical indistinctness, or erosive  changes is seen to suggest sacroiliitis.    Hips:    Symmetric axial joint space narrowing, mild, with associated mild  marginal osteophyte formation is seen bilaterally.  No juxta-articular  bony erosive changes are noted. The symphysis pubis demonstrates mild  degenerative changes.  There is chondrocalcinosis at the symphysis pubis.   There is no acute fracture or dislocation.  Incidental note is made of  vascular calcifications in the femoral arteries bilaterally.    No other significant abnormality is seen.  ---------------------------------------------  Exam End: 09/07/24 08:49    Specimen Collected: 09/07/24 08:49 Last Resulted: 09/08/24 16:05   Received From: [facility]  Result Received: 10/08/24 02:53      Current Outpatient Medications   Medication Instructions    alendronate (Fosamax) 70 mg tablet TAKE 1 TABLET BY MOUTH WEEKLY  WITH 8 OZ OF PLAIN WATER 30  MINUTES BEFORE FIRST FOOD, DRINK OR MEDS. STAY UPRIGHT FOR 30  MINS    amLODIPine (NORVASC) 10 mg, oral, Daily    folic acid (FOLVITE) 1 mg, oral, Daily    levothyroxine (Synthroid, Levoxyl) 50 mcg tablet TAKE 1 TABLET BY MOUTH ON AN  EMPTY STOMACH ONCE DAILY IN THE  MORNING    lisinopriL-hydrochlorothiazide 20-12.5 mg  tablet 1 tablet, oral, Daily    predniSONE (Deltasone) 5 mg tablet Take 4 tablets (20 mg) by mouth early in the morning. for 7 days, THEN 3 tablets (15 mg) early in the morning. for 14 days, THEN 2.5 tablets (12.5 mg) early in the morning. for 14 days, THEN 2 tablets (10 mg) early in the morning.

## 2024-11-29 LAB
NIL(NEG) CONTROL SPOT COUNT: NORMAL
PANEL A SPOT COUNT: 0
PANEL B SPOT COUNT: 0
POS CONTROL SPOT COUNT: NORMAL
T-SPOT. TB INTERPRETATION: NEGATIVE

## 2024-12-03 ENCOUNTER — TELEPHONE (OUTPATIENT)
Dept: RHEUMATOLOGY | Facility: CLINIC | Age: 79
End: 2024-12-03
Payer: MEDICARE

## 2024-12-03 NOTE — TELEPHONE ENCOUNTER
I tried calling the patient to let her know that her kidney levels are elevated. There was no answer so I left a message.

## 2024-12-10 DIAGNOSIS — M05.79 RHEUMATOID ARTHRITIS INVOLVING MULTIPLE SITES WITH POSITIVE RHEUMATOID FACTOR (MULTI): ICD-10-CM

## 2024-12-10 RX ORDER — FOLIC ACID 1 MG/1
1 TABLET ORAL DAILY
Qty: 90 TABLET | Refills: 3 | Status: SHIPPED | OUTPATIENT
Start: 2024-12-10

## 2024-12-14 ENCOUNTER — HOSPITAL ENCOUNTER (OUTPATIENT)
Dept: RADIOLOGY | Facility: HOSPITAL | Age: 79
Discharge: HOME | End: 2024-12-14
Payer: MEDICARE

## 2024-12-14 DIAGNOSIS — R29.898 WEAKNESS OF LEFT HIP: ICD-10-CM

## 2024-12-14 DIAGNOSIS — M35.3 PMR (POLYMYALGIA RHEUMATICA) (MULTI): ICD-10-CM

## 2024-12-14 PROCEDURE — 73721 MRI JNT OF LWR EXTRE W/O DYE: CPT | Mod: LT

## 2024-12-20 ENCOUNTER — TELEPHONE (OUTPATIENT)
Facility: CLINIC | Age: 79
End: 2024-12-20
Payer: MEDICARE

## 2025-01-05 NOTE — PROGRESS NOTES
"Subjective   Patient ID: [id number]  Pat Delarosa is a 79 y.o. female who presents for follow up    HPI  HPI  PMH/PSH: HTN, HyperPTH s/p parathyroidectomy, hypothyroidism, lumbar DDD, R sided macular degeneration, CKD?  Social: Nonsmoker, no alcohol, no drug use. Retired, was a , retired 10 years ago. Had 4 children but lost 2   FHx: No family history of autoimmune diseases     Recall from  and [facility] records 09/2024:  She was seeing Dr. Hansen x11 years for seropositive RA and was on HCQ.      Patient cannot recall how her symptoms started before diagnosis but thinks her arms and legs were sore and was then diagnosed with inflammatory arthritis and PMR 12/2019 by Dr. Boggs and started on prednisone.      She was treated with methotrexate and Enbrel initially which were ineffective and then was switched to Actemra. She is unsure how the effective the Actemra was \"I was still having weakness for my legs, they were working but I also think they weren't\" Her Actemra infusions were later switched to [facility] infusion center which was too far so she stopped taking the infusions.   She reports she was doing well off Actemra until August 2024 (all of a sudden she had severe pain in neck, shoulders and hip area associated with stiffness), getting out of bed was extremely a chore. She lost 15 lbs (140 to 125 lb) Went to her primary care doctor and they prescribed her prednisone 8/15/2024. 60 mg for 2 days, 50 mg for 2 days, 10 mg every 2 days. Her last dose was 8/27/2024. That significantly helped her symptoms. She reported recurrence of symptoms within 2 days of stopping the prednisone. She was then seen at [facility] rheumatology and restarted on prednisone 20mg for 6 weeks and her symptoms improved but she reported b/l thigh weakness and stiffness recurred when the prednisone was off (per notes she was supposed to taper it off, unclear if she did the taper, is forgetful) so it was increased again to " 20mg.     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 This makes a   diagnosis of hereditary hemochromatosis very unlikely.      09/06/2024 at [facility]:  ESR and CRP normal   CCP 41, RF neg, mildy elevated free kappa w/o m protein     Current meds:  Prednisone 10mg (tapered from 20mg)  Fosamax weekly since 05/2021  Vitamin D 2000 international units daily      Prior meds:  Methotrexate  HCQ  Enbrel   Actemra      At last visit, she reported again significant prednisone response and was placed on a prednisone taper.   Today she reports ongoing improvement with b/l hip pain, buttock pain, shoulder and arm pain and stiffness (100%) while on the prednisone taper. She is back to full functionality. She just started 10mg of prednisone yesterday.   Hasn't been losing any additional weight.     She reports her legs below the knees feel weak when she is going up the stairs, going down the stairs is fine. No knee pain or swelling. No numbness/tingling.     Specifically asked the patient about pain/swelling/ in MCPs and PIPs and wrists, knees, ankles and MTPs and she denied it even prior to being on prednisone     Every once in a while she gets locking in the third and 4th digit with overuse (such as when she is doing cookies), not happening as often    No fever, chills, weight loss, night sweats or headaches. No dry eyes, blurry vision, redness or pain or photophobia. No dry mouth, dental loss, loss of taste, nasal or oral ulcers, difficulty swallowing. No chest pain, palpitations, orthopnea. No shortness of breath, cough. No dysphagia, nausea, vomiting, heartburn, abdominal pain, constipation, diarrhea, melena or  hematochezia  No genital or anal ulcers. No photosensitivity, rash or lesions, Raynaud's phenomenon. No numbness or tingling.     Patient Active Problem List   Diagnosis    Abnormal laboratory test result    Depression    Dyslipidemia    Benign hypertension    Gastroesophageal reflux disease    Hypercalcemia    Hyperglycemia    Hypothyroidism (acquired)    IgG deficiency (Multi)    Macrocytosis    Menopausal symptom    Osteoporosis    Rheumatoid arthritis involving multiple sites with positive rheumatoid factor (Multi)    Vitamin D deficiency    Age-related osteoporosis without current pathological fracture    Oropharyngeal dysphagia    Stage 3a chronic kidney disease (Multi)    Exudative age-related macular degeneration, right eye, with active choroidal neovascularization    Peripheral vascular disease, unspecified (CMS-HCC)    Postprocedural hypoparathyroidism (Multi)       Past Medical History:   Diagnosis Date    CKD (chronic kidney disease) stage 2, GFR 60-89 ml/min 09/03/2023       No past surgical history on file.    Social History     Socioeconomic History    Marital status:      Spouse name: Not on file    Number of children: Not on file    Years of education: Not on file    Highest education level: Not on file   Occupational History    Not on file   Tobacco Use    Smoking status: Former     Types: Cigarettes    Smokeless tobacco: Never   Vaping Use    Vaping status: Never Used   Substance and Sexual Activity    Alcohol use: Not Currently    Drug use: Never    Sexual activity: Defer   Other Topics Concern    Not on file   Social History Narrative    Not on file     Social Drivers of Health     Financial Resource Strain: Not on file   Food Insecurity: Not on file   Transportation Needs: Not on file   Physical Activity: Not on file   Stress: Not on file   Social Connections: Not on file   Intimate Partner Violence: Not on file   Housing Stability: Not on file       No Known Allergies      Current  Outpatient Medications:     alendronate (Fosamax) 70 mg tablet, TAKE 1 TABLET BY MOUTH WEEKLY  WITH 8 OZ OF PLAIN WATER 30  MINUTES BEFORE FIRST FOOD, DRINK OR MEDS. STAY UPRIGHT FOR 30  MINS, Disp: 12 tablet, Rfl: 3    amLODIPine (Norvasc) 10 mg tablet, TAKE 1 TABLET BY MOUTH ONCE  DAILY, Disp: 100 tablet, Rfl: 2    folic acid (Folvite) 1 mg tablet, Take 1 tablet (1 mg) by mouth once daily., Disp: 90 tablet, Rfl: 3    levothyroxine (Synthroid, Levoxyl) 50 mcg tablet, TAKE 1 TABLET BY MOUTH ON AN  EMPTY STOMACH ONCE DAILY IN THE  MORNING, Disp: 100 tablet, Rfl: 2    lisinopriL-hydrochlorothiazide 20-12.5 mg tablet, Take 1 tablet by mouth once daily., Disp: 90 tablet, Rfl: 2    predniSONE (Deltasone) 5 mg tablet, Take 4 tablets (20 mg) by mouth early in the morning. for 7 days, THEN 3 tablets (15 mg) early in the morning. for 14 days, THEN 2.5 tablets (12.5 mg) early in the morning. for 14 days, THEN 2 tablets (10 mg) early in the morning., Disp: 165 tablet, Rfl: 0    Objective     Visit Vitals  /64   Pulse 60     Physical Exam  Copied from previous exam  General: AAOx3, Cooperative  Head: normocephalic, atraumatic  Eyes: EOMI, conjunctiva clear, sclera white, anicteric  Throat/Mouth: No oral deformities, no cheek swelling, mucosa appear dry, no oral ulcers noted or loss of dentition   Neck/Lymph: FROM, trachea midline  Lungs: chest expansion symmetric. No respiratory distress.   Heart: RRR  Neuro: CN II-XII grossly intact, no focal deficit  L hip proximal weakness 3+/5, rest of muscle strength is 5/5 proximal and distal.  Skin: No rashes, ulcers or photosensitive areas  MSK: Upper Extremities:  Hand/Fingers: No erythema, swelling, tenderness or warmth at DIP, PIP, or MCP joints, FROM grossly. Good hand . No nodules. No deformities + OA changes  Wrists: No erythema, swelling, warmth or tenderness at wrist, FROM grossly  Elbows: No tenderness, swelling, erythema or warmth at elbows, FROM grossly. No nodules    Shoulders: No swelling, erythema, tenderness or warmth at shoulders. FROM  Lower Extremities:   Hips: No obvious deformities. No joint tenderness, normal ROM grossly. Log roll test negative bilaterally. Sourav test is negative bilaterally but with mild limitation. No trochanteric bursae TTP  Knees: No tenderness, deformities, swelling, rashes, or warmth, normal ROM grossly. No crepitus, no pes anserine bursa TTP   Ankles: No deformities, tenderness, edema, erythema, ulceration, or warmth at the ankle  Feet: Negative MTP squeeze. Normal ROM grossly.       Lab Results   Component Value Date    WBC 16.3 (H) 11/26/2024    HGB 14.3 11/26/2024    HCT 41.4 11/26/2024    MCV 95 11/26/2024     11/26/2024       Chemistry    Lab Results   Component Value Date/Time     11/26/2024 1049    K 4.9 11/26/2024 1049    CL 97 (L) 11/26/2024 1049    CO2 28 11/26/2024 1049    BUN 24 (H) 11/26/2024 1049    CREATININE 1.21 (H) 11/26/2024 1049    Lab Results   Component Value Date/Time    CALCIUM 10.2 11/26/2024 1049    ALKPHOS 61 11/26/2024 1049    AST 30 11/26/2024 1049    ALT 30 11/26/2024 1049    BILITOT 0.7 11/26/2024 1049          Lab Results   Component Value Date    CRP 0.24 11/26/2024    SPEP  12/03/2019     (Note)    No definitive M protein is identified on protein   electrophoresis.      PTH 68 (H) 07/07/2023    CALCIUM 10.2 11/26/2024    CAION 1.25 04/09/2020    PHOS 3.5 01/03/2020     Alkaline Phosphatase   Date Value Ref Range Status   11/26/2024 61 33 - 136 U/L Final     AST   Date Value Ref Range Status   11/26/2024 30 9 - 39 U/L Final     Urea Nitrogen   Date Value Ref Range Status   11/26/2024 24 (H) 6 - 23 mg/dL Final     Sodium   Date Value Ref Range Status   11/26/2024 138 136 - 145 mmol/L Final     Potassium   Date Value Ref Range Status   11/26/2024 4.9 3.5 - 5.3 mmol/L Final     Bicarbonate   Date Value Ref Range Status   11/26/2024 28 21 - 32 mmol/L Final     ALT   Date Value Ref Range Status    11/26/2024 30 7 - 45 U/L Final     Comment:     Patients treated with Sulfasalazine may generate falsely decreased results for ALT.     Vitamin D, 25-Hydroxy, Total   Date Value Ref Range Status   05/03/2024 52 31 - 100 ng/mL Final       MR hip left wo IV contrast  Narrative: Interpreted By:  Mike Hamilton,   STUDY:  MRI of the pelvis and  left hip without IV contrast;  12/14/2024  10:52 am      INDICATION:  Signs/Symptoms:evaluate for bursitis, tendinitis, has unilateral left  proximal lower extremity weakness.      ,M35.3 Polymyalgia rheumatica (Multi),R29.898 Other symptoms and  signs involving the musculoskeletal system      COMPARISON:  None.      ACCESSION NUMBER(S):  [id number]      ORDERING CLINICIAN:  HANNY DE LUNA      TECHNIQUE:  MR imaging of the pelvis and  left hip was obtained  without the  administration of intravenous contrast medium.      FINDINGS:  TENDONS:  There is mild left gluteus minimus tendinosis without tear. The  gluteal tendons otherwise intact bilaterally.      The insertions of the iliopsoas tendons are intact bilaterally.      Mild bilateral hamstring origin tendinosis with partial-thickness  undersurface tearing. The adductor tendon origins are intact.      JOINTS:  Dedicated imaging of the  left hip demonstrates moderate diffuse  articular cartilage thinning.. There is degenerative acetabular  truncation.      Limited large field-of-view evaluation of the contralateral hip  demonstrates moderate diffuse articular cartilage thinning.      Small left hip joint effusion.      There is no evidence of avascular necrosis.      Mild bilateral sacroiliac degenerative change.      Mild pubic symphyseal degenerative change.      Lower lumbar disc desiccation and facet hypertrophy.      OSSEOUS STRUCTURES:  There is marrow edema of the right medial acetabular subchondral  cystic change. Tiny subchondral cysts are seen of the left  acetabulum. No fractures. No marrow replacing lesions.       SOFT TISSUES:  No muscle atrophy or tear is seen.      The sciatic nerves are intact and unremarkable.      INTERNAL ORGANS:  Evaluation of the internal organs of the pelvis is limited on this  study tailored for evaluation of the musculoskeletal system. No gross  abnormality is seen in the pelvic organs.      Impression: Moderate bilateral hip articular cartilage thinning with subchondral  cysts. There is an asymmetric small left hip joint effusion. No  adjacent soft tissue edema to strongly suggest the presence of septic  arthritis. However, if there is clinical concern consider correlation  with inflammatory markers and/or arthrocentesis.      Mild polyarticular degenerative changes, as above.      Mild left-sided gluteus minimus tendinosis without tear. There also  bilateral hamstring origin tendinosis, mild, with low-grade tearing.      MACRO:  None      Signed by: Mike Hamilton 12/15/2024 4:28 PM  Dictation workstation:   [id number]    [facility]  Lumbar spine:   Counting reference:  Lumbosacral junction.  For the purposes of this   report,  L4-5 is considered the level of the iliac crest and assume there   are 5 lumbar-type vertebrae.  Anatomic variant:  None.     The vertebral body heights are preserved.  Dextroscoliosis centered at   L2-L3 is present.  Multilevel degenerative disc disease with endplate   marginal osteophytes and intervertebral disc space narrowing most   prominently at L2-L3 which is severe with vacuum disc phenomenon in the   anterior aspect of the intervertebral disc space, mild otherwise in the   remaining levels.  Mild retrolisthesis of L2 on L3 and L3 on L4 is   present.  Moderate facet arthropathy in the lower lumbar spine is   present, most prominently at L4-5 and L5-S1. Atherosclerotic   calcification is noted in the aorta.     Sacroiliac joints:   Symmetric mild degenerative changes in the bilateral sacroiliac joints   are noted.  No abnormal widening,  cortical indistinctness, or erosive   changes is seen to suggest sacroiliitis.     Hips:   Symmetric axial joint space narrowing, mild, with associated mild   marginal osteophyte formation is seen bilaterally.  No juxta-articular   bony erosive changes are noted. The symphysis pubis demonstrates mild   degenerative changes.  There is chondrocalcinosis at the symphysis pubis.    There is no acute fracture or dislocation.  Incidental note is made of   vascular calcifications in the femoral arteries bilaterally.      Hand XR 09/2024 at [facility]  RESULT:     Note is made of proximal interphalangeal and distal interphalangeal joint   degenerative changes which are overall mild.   Moderate second   metacarpophalangeal and third metacarpophalangeal joint space narrowing   on the RIGHT side is present.  Joint capsule calcification is seen in the   LEFT second and RIGHT second and third metacarpophalangeal joint   capsules.  Triangular fibrocartilage chondrocalcinosis is present   bilaterally.  No juxta-articular bony erosive changes are noted.      DEXA 01/2024  FINDINGS:  SPINE L1-L4  Bone Mineral Density: 1.392 g/cm2  T-Score 3.1  Z-Score 5.7  Bone Mineral Density change vs baseline:  16.9 %  Bone Mineral Density change vs previous: 9.9 %      LEFT FEMUR -TOTAL  Bone Mineral Density: 0.836 g/cm2  T-Score -0.9   Z-Score  1.1  Bone Mineral Density change vs baseline: -9.8 %  Bone Mineral Density change vs previous: -7.5 %      LEFT FEMUR -NECK  Bone Mineral Density: 0.673 g/cm2  T-Score -1.6  Z-Score 0.7     DEXA 04/2021  IMPRESSION:     BMD of lumbar spine is in the normal range with T-score of 2.0.  Significant interval increase in bone mineral density since the baseline by  6.3%. No significant change since the previous.  BMD of the LEFT total hip is in the normal range with T score of -0.3.   No  significant changes in bone marrow density.  BMD of the femoral neck is in osteopenic range with T-score of -1.6 .  BMD of the  left forearm is in the normal range with T score of 0.2.  BMD of the left forearm UD is in the normal range with T score of -0.1.     XR ankle right 2016  IMPRESSION:     BMD of lumbar spine is in the normal range with T-score of 2.0.  Significant interval increase in bone mineral density since the baseline by  6.3%. No significant change since the previous.  BMD of the LEFT total hip is in the normal range with T score of -0.3.   No  significant changes in bone marrow density.  BMD of the femoral neck is in osteopenic range with T-score of -1.6 .  BMD of the left forearm is in the normal range with T score of 0.2.  BMD of the left forearm UD is in the normal range with T score of -0.1.    Assessment/Plan   A 79 year old F here for follow up.      She has a previous diagnosis of RA and PMR. She has some difficulty providing a detailed medical history but Dr. Boggs's office notes, labs from [facility] were reviewed.      Patient reports a classic PMR picture with pelvic and shoulder girdle pain/stiffness and geling phenomenon that significantly resolved with prednisone with recurrence in her symptoms once prednisone was discontinued.   Today she reports 100% resolution in pelvic and shoulder girdle symptoms while on 20mg of prednisone even with tapering of prednisone.      She also has a positive ACPA from [facility] records, but reports no episodes of pain or swelling in the peripheral joints. Physical exam without evidence of peripheral synovitis but patient is on 10mg of prednisone so this will need to be monitored closely while further tapering down prednisone.   Testing done at  with low titer RF, negative ACPA  On review of prior imaging performed at [facility], she has chondrocalcinosis and moderate narrowing of 2nd and 3rd MCP, chondrocalcinosis at symphysis pubis and hip osteoarthritis.   PTH is normal, magnesium and phosphorus are normal. She is heterozygous carrier of the H63D variant, making a diagnosis of hemochromatosis  nathan.     MR hip with b/l moderate OA, an asymmetric small left hip joint effusion.  mild SI OA, mild pubic symphysis OA,  Mild left-sided gluteus minimus tendinosis without tear. There also  bilateral hamstring origin tendinosis, mild, with low-grade tearing.     She has unilateral left proximal LE weakness, has chronic low back pain, suspect component of lumbar pathology, she has known multilevel DDD, but will evaluate for worsening with lumbar MRI, rest of muscle strength is normal. CPK and aldolase are normal. EMG of LLE pending.     Impression: PMR  Also concern for inflammatory arthritis with low titer RF, positive ACPA at [facility] previously, chondrocalcinosis   Flexor stenosing tenosynovitis of L hand     - Labs today  - EMG pending   - MRI Lumbar spine ordered   - Taper prednisone by 1mg qmonth   - Discussed prednisone monotherapy and taper vs steroid sparing options including sarilumab, methotrexate (GFR is 56). Patient would like to avoid infusions due to not being able to make the infusion center drives. Will plan to start low dose methotrexate 10mg/week + FA and monitor labs. Discussed risks/benefits of methotrexate with her in depth.   - She will need SPEP periodically, has a previous mildy elevated free kappa w/o m protein, ordered  - Counseled on flexor stenosing tenosynovitis, voltaren gel and splinting   - Repeat labs to be done prior to follow up      Osteopenia with high FRAX  - continue fosamax and vitamin D   - Needs DEXA 01/2026, she will be on fosamax for 5 years, will discuss drug holiday at that time vs continuation of BP    BP elevated today, continue to follow with PCP.      RTC in 2 months for follow up      Sang More MD  Division of Rheumatology   [facility]

## 2025-01-06 ENCOUNTER — LAB (OUTPATIENT)
Dept: LAB | Facility: LAB | Age: 80
End: 2025-01-06
Payer: MEDICARE

## 2025-01-06 ENCOUNTER — OFFICE VISIT (OUTPATIENT)
Dept: RHEUMATOLOGY | Facility: CLINIC | Age: 80
End: 2025-01-06
Payer: MEDICARE

## 2025-01-06 VITALS
OXYGEN SATURATION: 95 % | HEART RATE: 60 BPM | WEIGHT: 134.4 LBS | DIASTOLIC BLOOD PRESSURE: 64 MMHG | BODY MASS INDEX: 24.58 KG/M2 | SYSTOLIC BLOOD PRESSURE: 164 MMHG

## 2025-01-06 DIAGNOSIS — R76.8 RHEUMATOID FACTOR POSITIVE: ICD-10-CM

## 2025-01-06 DIAGNOSIS — R29.898 WEAKNESS OF BOTH LOWER EXTREMITIES: ICD-10-CM

## 2025-01-06 DIAGNOSIS — M35.3 PMR (POLYMYALGIA RHEUMATICA) (MULTI): ICD-10-CM

## 2025-01-06 DIAGNOSIS — M11.20 CHONDROCALCINOSIS: ICD-10-CM

## 2025-01-06 DIAGNOSIS — M35.3 PMR (POLYMYALGIA RHEUMATICA) (MULTI): Primary | ICD-10-CM

## 2025-01-06 LAB
ALBUMIN SERPL BCP-MCNC: 4.8 G/DL (ref 3.4–5)
ALP SERPL-CCNC: 48 U/L (ref 33–136)
ALT SERPL W P-5'-P-CCNC: 28 U/L (ref 7–45)
ANION GAP SERPL CALCULATED.3IONS-SCNC: 16 MMOL/L (ref 10–20)
AST SERPL W P-5'-P-CCNC: 23 U/L (ref 9–39)
BASOPHILS # BLD AUTO: 0.04 X10*3/UL (ref 0–0.1)
BASOPHILS NFR BLD AUTO: 0.2 %
BILIRUB SERPL-MCNC: 0.6 MG/DL (ref 0–1.2)
BUN SERPL-MCNC: 36 MG/DL (ref 6–23)
CALCIUM SERPL-MCNC: 10.6 MG/DL (ref 8.6–10.3)
CHLORIDE SERPL-SCNC: 101 MMOL/L (ref 98–107)
CO2 SERPL-SCNC: 27 MMOL/L (ref 21–32)
CREAT SERPL-MCNC: 1.21 MG/DL (ref 0.5–1.05)
CRP SERPL-MCNC: <0.1 MG/DL
EGFRCR SERPLBLD CKD-EPI 2021: 46 ML/MIN/1.73M*2
EOSINOPHIL # BLD AUTO: 0 X10*3/UL (ref 0–0.4)
EOSINOPHIL NFR BLD AUTO: 0 %
ERYTHROCYTE [DISTWIDTH] IN BLOOD BY AUTOMATED COUNT: 13 % (ref 11.5–14.5)
ERYTHROCYTE [SEDIMENTATION RATE] IN BLOOD BY WESTERGREN METHOD: 3 MM/H (ref 0–30)
GLUCOSE SERPL-MCNC: 159 MG/DL (ref 74–99)
HCT VFR BLD AUTO: 43 % (ref 36–46)
HGB BLD-MCNC: 14.6 G/DL (ref 12–16)
IMM GRANULOCYTES # BLD AUTO: 0.13 X10*3/UL (ref 0–0.5)
IMM GRANULOCYTES NFR BLD AUTO: 0.8 % (ref 0–0.9)
LYMPHOCYTES # BLD AUTO: 1.76 X10*3/UL (ref 0.8–3)
LYMPHOCYTES NFR BLD AUTO: 10.4 %
MCH RBC QN AUTO: 33 PG (ref 26–34)
MCHC RBC AUTO-ENTMCNC: 34 G/DL (ref 32–36)
MCV RBC AUTO: 97 FL (ref 80–100)
MONOCYTES # BLD AUTO: 0.51 X10*3/UL (ref 0.05–0.8)
MONOCYTES NFR BLD AUTO: 3 %
NEUTROPHILS # BLD AUTO: 14.42 X10*3/UL (ref 1.6–5.5)
NEUTROPHILS NFR BLD AUTO: 85.6 %
NRBC BLD-RTO: 0 /100 WBCS (ref 0–0)
PLATELET # BLD AUTO: 290 X10*3/UL (ref 150–450)
POTASSIUM SERPL-SCNC: 4.6 MMOL/L (ref 3.5–5.3)
PROT SERPL-MCNC: 7.1 G/DL (ref 6.4–8.2)
PROT SERPL-MCNC: 7.3 G/DL (ref 6.4–8.2)
RBC # BLD AUTO: 4.42 X10*6/UL (ref 4–5.2)
SODIUM SERPL-SCNC: 139 MMOL/L (ref 136–145)
WBC # BLD AUTO: 16.9 X10*3/UL (ref 4.4–11.3)

## 2025-01-06 PROCEDURE — 99215 OFFICE O/P EST HI 40 MIN: CPT | Performed by: INTERNAL MEDICINE

## 2025-01-06 PROCEDURE — 3077F SYST BP >= 140 MM HG: CPT | Performed by: INTERNAL MEDICINE

## 2025-01-06 PROCEDURE — 85025 COMPLETE CBC W/AUTO DIFF WBC: CPT

## 2025-01-06 PROCEDURE — 1036F TOBACCO NON-USER: CPT | Performed by: INTERNAL MEDICINE

## 2025-01-06 PROCEDURE — 1159F MED LIST DOCD IN RCRD: CPT | Performed by: INTERNAL MEDICINE

## 2025-01-06 PROCEDURE — 86140 C-REACTIVE PROTEIN: CPT

## 2025-01-06 PROCEDURE — 84165 PROTEIN E-PHORESIS SERUM: CPT | Performed by: INTERNAL MEDICINE

## 2025-01-06 PROCEDURE — 80053 COMPREHEN METABOLIC PANEL: CPT

## 2025-01-06 PROCEDURE — 84155 ASSAY OF PROTEIN SERUM: CPT

## 2025-01-06 PROCEDURE — 1157F ADVNC CARE PLAN IN RCRD: CPT | Performed by: INTERNAL MEDICINE

## 2025-01-06 PROCEDURE — 85652 RBC SED RATE AUTOMATED: CPT

## 2025-01-06 PROCEDURE — 3078F DIAST BP <80 MM HG: CPT | Performed by: INTERNAL MEDICINE

## 2025-01-06 PROCEDURE — 84165 PROTEIN E-PHORESIS SERUM: CPT

## 2025-01-06 RX ORDER — METHOTREXATE 2.5 MG/1
10 TABLET ORAL WEEKLY
Qty: 48 TABLET | Refills: 0 | Status: SHIPPED | OUTPATIENT
Start: 2025-01-06 | End: 2025-04-06

## 2025-01-06 RX ORDER — FOLIC ACID 1 MG/1
1 TABLET ORAL DAILY
Qty: 30 TABLET | Refills: 5 | Status: SHIPPED | OUTPATIENT
Start: 2025-01-06 | End: 2025-07-05

## 2025-01-06 RX ORDER — PREDNISONE 1 MG/1
TABLET ORAL
Qty: 720 TABLET | Refills: 0 | Status: SHIPPED | OUTPATIENT
Start: 2025-01-06 | End: 2025-04-06

## 2025-01-06 ASSESSMENT — ENCOUNTER SYMPTOMS
DEPRESSION: 0
OCCASIONAL FEELINGS OF UNSTEADINESS: 0
LOSS OF SENSATION IN FEET: 0

## 2025-01-06 NOTE — PATIENT INSTRUCTIONS
Continue 10mg of prednisone for 30 days (2 of the 5mg), then taper down the prednisone by 1mg every month (9mg for 1 month, then 8mg for one month then 7mg for one month as instructed in the prednisone instructions.      Please get labs done today and a week before the next appointment, both are ordered in the system.     For the methotrexate take 4 pills one day of the week and folic acid daily.      For the trigger finger use splinting of that finger and over the counter topical voltaren gel (1%) twice daily as needed     I have ordered an MRI of your lower back, you will just need to call and get is scheduled    Methotrexate (Rheumatrex, Trexall, Otrexup, Rasuvo)  Methotrexate is one of the most effective and commonly used medications in the treatment of rheumatoid arthritis and other forms of inflammatory arthritis. It may also be used to treat lupus, inflammatory myositis, vasculitis, psoriasis, scleroderma, and some forms of childhood arthritis. It is known as a disease-modifying anti-rheumatic drug (DMARD), because it not only decreases the pain and swelling of arthritis, but it also can decrease damage to joints and long-term disability.    How To Take It  Methotrexate comes either as pills or as a subcutaneous injection. Methotrexate is usually taken as a single dose once per week, although occasionally the dose is split into two to improve absorption or avoid side effects. Your doctor also may prescribe a folic acid (or folate) supplement to decrease the chance of side effects. Alcohol should be avoided as it can significantly increase the risk for liver damage while taking methotrexate. Contraception is highly encouraged while on Methotrexate as this medication can cause fetal abnormalities. Regular laboratory monitoring is required while taking methotrexate. Symptomatic improvement can be noted within three to six weeks of initiating therapy, but full benefit of this drug may take up to 12 weeks.    Side  Effects  Methotrexate can lower the ability of your immune system to fight infections. If you develop an infection while using this medication, you should stop it and contact your doctor. The most common side effects are gastrointestinal upset and elevations of liver function tests.    The most common side effects are nausea and diarrhea. Mouth sores, rash, dizziness, and abnormalities in blood counts can also be seen but are less common.    Methotrexate rarely can cause liver issues, most likely to occur in patients who already have liver problems or are using alcohol or taking other drugs that are toxic to the liver. Slow hair loss is seen in some patients but reverses once medication is stopped. This can often be managed by taking folic acid. It is important to remember that most patients do not experience side effects.    Tell Your Rheumatology Provider  You should contact your rheumatology provider if you develop symptoms of an infection, such as a fever or cough, or if you think you are having any side effects. Be sure to let your rheumatology provider know if you are pregnant, planning to get pregnant, or if you are breastfeeding. Methotrexate treatment should be discontinued for at least three months before attempting to become pregnant. Men taking methotrexate should talk to their physician prior to attempts to conceive. If you are planning on having surgery or will be receiving chemotherapy or radiation therapy, talk to your rheumatology provider first.    Updated February 2024 by Melba Kern MD, and reviewed by the [employer] College [employer] Committee on Communications and Marketing.    This patient fact sheet is provided for general education only. Individuals should consult a qualified health care provider for professional medical advice, diagnosis, and treatment of a medical or health condition.

## 2025-01-07 ENCOUNTER — TELEPHONE (OUTPATIENT)
Dept: RHEUMATOLOGY | Facility: CLINIC | Age: 80
End: 2025-01-07
Payer: MEDICARE

## 2025-01-07 NOTE — TELEPHONE ENCOUNTER
"[facility] ENDOCRINOLOG[facility] INITIAL VISIT  09/06/2022     The patient was seen in the multidisciplinary pituitary clinic with Dr. Ferrell today.       Due to language barrier, an  was present during the history-taking and subsequent discussion with this patient.      Subjective:      Reason for referal: referred by Sabiha Overton MD for evaluation and management of pituitary lesion.     HPI:   Tessa Wynne is a 47 y.o. female who presents for evaluation of pituitary lesion compressing optic chiasm, found on imaging done after a syncopal episode    Initial presentation:   Brain imaging done for syncope and HA.      Imaging:     MRI 8/15/22  Sella: There is a large sellar lesion with suprasellar extension.  Lesion measures roughly 3.2 x 2.8 x 3.5 cm.  There is mass effect on the adjacent structures including the optic chiasm which is displaced superiorly as well as the bilateral A1 segments of the anterior cerebral arteries.  There is possible extension into the cavernous sinuses bilaterally best visualized on coronal T2 series 10.  The carotid flow voids are preserved.  There is bony remodeling of the adjacent clivus secondary to this lesion with preserved marrow signal.    Headache:    HA x 1 year, occipital pain a couple times per week     Vision change:   Some blurring when reading, denies peripheral change     Formal Visual burns:   HVF w/ Dr. Yan 8/25/22, per note:  " OCT with borderline RNFL thinning OD>OS. HVF with generalized depression OD (worse temporally) and temporal depression OS. Findings consistent with chiasmal compression." rtc in 3 month(s)    Pituitary labs: with elevated prolactin of 138 (likely from stalk compression), secondary hypothyroidism, otherwise normal   Latest Reference Range & Units 08/26/22 08:45   Cortisol, 8 AM 4.30 - 22.40 ug/dL  4.30 - 22.40 ug/dL 13.70  12.80   ACTH 0 - 46 pg/mL 19   Somatomedin (IGF-I) 44 - 227 ng/mL 42 (L)   TSH 0.400 - 4.000 uIU/mL 2.402   Free T4 " I was calling to see if the pt was taking any calcium supplements and what the dosage was on it. The doctor asked me to tell the pt to stop taking the calcium supplements.    0.71 - 1.51 ng/dL 0.65 (L)   FSH See Text mIU/mL 10.72   Prolactin 5.2 - 26.5 ng/mL 138.0 (H)   (    Current symptoms:  Hyperprolactinemia:  []  breast tenderness [x]  nipple discharge []  Menstrual Irregularity []  Denies   Initially noticed blx nipple dc for a few years around time of menstrual cycle   Reports that after D&C in 2/2022 galactorrhea resolved    Lab Results   Component Value Date    PROLACTIN 138.0 (H) 08/26/2022     Thyroid:  [x]  fatigue [x]  weight up 5 lbs in 3 month(s) []  temp intolerance  []  Denies    []  BM change []  skin/hair change [x] palpitations []  tremor []  Denies    Lab Results   Component Value Date    TSH 2.402 08/26/2022    FREET4 0.65 (L) 08/26/2022         Growth Hormone Excess:  []  increase hand/foot size []  teeth spacing change    [x]  Denies    []  worse glycemic control []  joint pain     [x]  Denies    Last IGF-1:   Lab Results   Component Value Date    SOMATMDN 42 (L) 08/26/2022        Cushing's syndrome:   [x]  Easy bruising [x]  Weight gain  []  Worse glycemic control   []  HTN  []  Acne  []  Hirsutism  []  Striae  []  Menstrual change []  VTE   []  Fractures  []  Denies All    One episode of LOC 2 month(s) ago at the gym after doing cardio w/o prodrome  denies symptoms of adrenal insufficiency (no lightheadedness, N/V/abd pain, hypotension).        Gonadotrophs:     []  Irregular menses []  Postmenopausal  []  Decreased libido   []  ED   []  Denies    Previously regular then Irregular menses for 4 month(s) prior to d&c in 2/2022 (prior to that 3 month(s) of bleeding)  Repro hx: 2 children, no problems with conception  LMP: prior to D&C    DI:   [x]  polyuria  [x]  Polydipsia  []  Nocturia x 2   []  Denies    3 bottles 16 oz water daily    Review of patient's allergies indicates:  No Known Allergies      Current Outpatient Medications:     FINACEA 15 % Foam, APPLY TO FACE TWICE A DAY, Disp: , Rfl:     hydrocortisone 2.5 % cream, Apply topically 2 (two) times daily as  needed. (Patient not taking: Reported on 9/6/2022), Disp: 20 g, Rfl: 0    hydrocortisone 2.5 % ointment, ELAINE EXT AA BID FOR 7 DAYS, Disp: , Rfl:     ibuprofen (ADVIL,MOTRIN) 800 MG tablet, Take 1 tablet (800 mg total) by mouth every 8 (eight) hours as needed for Pain. (Patient not taking: Reported on 9/6/2022), Disp: 30 tablet, Rfl: 0    metroNIDAZOLE (FLAGYL) 500 MG tablet, TAKE 1 TABLET(500 MG) BY MOUTH TWICE DAILY 2 HOURS AFTER A MEAL FOR 7 DAYS (Patient not taking: Reported on 9/6/2022), Disp: 14 tablet, Rfl: 0    norgestimate-ethinyl estradioL (ORTHO-CYCLEN) 0.25-35 mg-mcg per tablet, Take 1 tablet by mouth once daily., Disp: 30 tablet, Rfl: 11    pantoprazole (PROTONIX) 40 MG tablet, Take 1 tablet (40 mg total) by mouth once daily., Disp: 30 tablet, Rfl: 6    progesterone (PROMETRIUM) 200 MG capsule, Take 1 capsule (200 mg total) by mouth nightly. (Patient not taking: Reported on 9/6/2022), Disp: 30 capsule, Rfl: 0    tretinoin (RETIN-A) 0.025 % cream, APPLY PEA SIZED AMOUNT TO ENTIRE FACE ONCE EVERY NIGHT BEFORE BED, Disp: , Rfl:     ROS:  see HPI    Objective:   Physical Exam   There were no vitals taken for this visit.  Wt Readings from Last 3 Encounters:   09/06/22 77.7 kg (171 lb 4.8 oz)   09/01/22 76.9 kg (169 lb 8.5 oz)   08/01/22 78.4 kg (172 lb 13.5 oz)   ]    Constitutional:  Pleasant,  in no acute distress.   HENT:   Eyes:     No scleral icterus.   Respiratory:   Effort normal   Neurological:  normal speech  Psych:  Normal mood and affect.      LABORATORY REVIEW:    Chemistry        Component Value Date/Time     08/26/2022 0845    K 4.0 08/26/2022 0845     08/26/2022 0845    CO2 24 08/26/2022 0845    BUN 12 08/26/2022 0845    CREATININE 0.8 08/26/2022 0845    GLU 91 08/26/2022 0845        Component Value Date/Time    CALCIUM 8.8 08/26/2022 0845    ALKPHOS 55 07/29/2022 0827    AST 28 07/29/2022 0827    ALT 21 07/29/2022 0827    BILITOT 0.4 07/29/2022 0827    ESTGFRAFRICA >60 07/29/2022  0827    EGFRNONAA >60 07/29/2022 0827        MRI 8/15/22        Assessment/Plan:     Problem List Items Addressed This Visit          1 - High    Pituitary lesion      I have independently reviewed the pituitary MRI from 8/15/22 which shows a 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  The mass displacing the optic chiasm and see pituitary stalk is not visible.      Although the patient has not noticed any peripheral vision changes her formal HPF shows peripheral vision loss which can potentially be reversible with surgery.  Discussed indications for surgery including vision change as well as secondary hypothyroidism and she is agreeable to proceeding with surgery which she will discuss further with Dr. Ferrell today.      She has no signs or symptoms of adrenal insufficiency and has a normal 8:00 a.m. cortisol and ACTH so she does not require perioperative glucocorticoids.       Perioperative management recommendations:  - recommend inpatient endocrine consult on admission for monitoring for adrenal insufficiency, diabetes insipidus, and coordination of outpatient follow-up.  The inpatient endocrine service is not typically write orders for neuro surgery patient however we will communicate our recommendation.  Please refer to Inpatient transsphenoidal resection protocol guide for further details.  - Avoid routine use of perioperative glucocorticoid (dexamethasone, hydrocortisone, methylprednisolone, etc) administration.  - check daily 8:00 a.m. cortisol levels and if cortisol drops to less than 8 start oral hydrocortisone 20/10 mg which patient should be discharged on   - Do not check cortisol levels if the patient has received any glucocorticoids in the past 24 hours  - Monitor for DI with strict ins and outs q1H and if urine output is >250 cc x2 hours draw BMP, Serum osm, urine osm, urine Na and UA/SG stat and notify primary team and  endocrine on call  - Avoid inpatient ordering of LH, FSH, testosterone, estrogen, progesterone, TSH, free T4 as these are unlikely to be helpful in making clinical decisions during hospital admission.  - recommended inpatient labs:    - after discharge patient will need postop day 7 BMP to screen for development of SIADH.  - inpatient endocrine service to determine if additional endocrine labs need to be obtained prior to 2 week follow-up visit with me in multidisciplinary pituitary clinic.              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  After surgery will need to re-evaluate if she continues to need thyroid hormone replacement.  Will aim to keep free T4 upper half of the normal range            3     Bitemporal hemianopia     Planning to undergo transsphenoidal resection.  Will need follow-up field testing 2-3 months after surgery for new baseline.          Other Visit Diagnoses       Syncope and collapse        Abnormal MRI              Return to clinic  2 weeks aftyer surgery     Jimmy Batista MD

## 2025-01-08 LAB
ALBUMIN: 4.7 G/DL (ref 3.4–5)
ALPHA 1 GLOBULIN: 0.3 G/DL (ref 0.2–0.6)
ALPHA 2 GLOBULIN: 1 G/DL (ref 0.4–1.1)
BETA GLOBULIN: 0.8 G/DL (ref 0.5–1.2)
GAMMA GLOBULIN: 0.6 G/DL (ref 0.5–1.4)
PATH REVIEW-SERUM PROTEIN ELECTROPHORESIS: NORMAL
PROTEIN ELECTROPHORESIS COMMENT: NORMAL

## 2025-01-09 DIAGNOSIS — E83.52 HYPERCALCEMIA: Primary | ICD-10-CM

## 2025-01-14 ENCOUNTER — TELEPHONE (OUTPATIENT)
Dept: RHEUMATOLOGY | Facility: CLINIC | Age: 80
End: 2025-01-14
Payer: MEDICARE

## 2025-01-14 NOTE — TELEPHONE ENCOUNTER
I called to let the pt know that she has a referral in for endocrinology. Pt already had an appointment scheduled.

## 2025-01-27 ENCOUNTER — APPOINTMENT (OUTPATIENT)
Dept: RHEUMATOLOGY | Facility: CLINIC | Age: 80
End: 2025-01-27
Payer: MEDICARE

## 2025-01-27 ENCOUNTER — HOSPITAL ENCOUNTER (OUTPATIENT)
Dept: RADIOLOGY | Facility: HOSPITAL | Age: 80
Discharge: HOME | End: 2025-01-27
Payer: MEDICARE

## 2025-01-27 DIAGNOSIS — R29.898 WEAKNESS OF BOTH LOWER EXTREMITIES: ICD-10-CM

## 2025-01-27 PROCEDURE — 72148 MRI LUMBAR SPINE W/O DYE: CPT | Performed by: RADIOLOGY

## 2025-01-27 PROCEDURE — 72148 MRI LUMBAR SPINE W/O DYE: CPT

## 2025-02-17 DIAGNOSIS — E03.9 HYPOTHYROIDISM (ACQUIRED): ICD-10-CM

## 2025-02-18 RX ORDER — LEVOTHYROXINE SODIUM 50 UG/1
TABLET ORAL
Qty: 100 TABLET | Refills: 1 | Status: SHIPPED | OUTPATIENT
Start: 2025-02-18

## 2025-03-03 ENCOUNTER — APPOINTMENT (OUTPATIENT)
Dept: RHEUMATOLOGY | Facility: CLINIC | Age: 80
End: 2025-03-03
Payer: MEDICARE

## 2025-03-03 ENCOUNTER — OFFICE VISIT (OUTPATIENT)
Dept: RHEUMATOLOGY | Facility: CLINIC | Age: 80
End: 2025-03-03
Payer: MEDICARE

## 2025-03-03 VITALS
WEIGHT: 140.6 LBS | BODY MASS INDEX: 25.72 KG/M2 | SYSTOLIC BLOOD PRESSURE: 126 MMHG | OXYGEN SATURATION: 98 % | HEART RATE: 88 BPM | DIASTOLIC BLOOD PRESSURE: 82 MMHG

## 2025-03-03 DIAGNOSIS — R29.898 WEAKNESS OF BOTH LOWER EXTREMITIES: ICD-10-CM

## 2025-03-03 DIAGNOSIS — M47.816 LUMBAR SPONDYLOSIS: ICD-10-CM

## 2025-03-03 DIAGNOSIS — M11.20 CHONDROCALCINOSIS: ICD-10-CM

## 2025-03-03 DIAGNOSIS — M05.79 RHEUMATOID ARTHRITIS INVOLVING MULTIPLE SITES WITH POSITIVE RHEUMATOID FACTOR (MULTI): ICD-10-CM

## 2025-03-03 DIAGNOSIS — M35.3 PMR (POLYMYALGIA RHEUMATICA) (MULTI): Primary | ICD-10-CM

## 2025-03-03 DIAGNOSIS — R76.8 RHEUMATOID FACTOR POSITIVE: ICD-10-CM

## 2025-03-03 PROCEDURE — 99215 OFFICE O/P EST HI 40 MIN: CPT | Performed by: INTERNAL MEDICINE

## 2025-03-03 PROCEDURE — 1157F ADVNC CARE PLAN IN RCRD: CPT | Performed by: INTERNAL MEDICINE

## 2025-03-03 PROCEDURE — 1159F MED LIST DOCD IN RCRD: CPT | Performed by: INTERNAL MEDICINE

## 2025-03-03 PROCEDURE — 3074F SYST BP LT 130 MM HG: CPT | Performed by: INTERNAL MEDICINE

## 2025-03-03 PROCEDURE — 1126F AMNT PAIN NOTED NONE PRSNT: CPT | Performed by: INTERNAL MEDICINE

## 2025-03-03 PROCEDURE — 3079F DIAST BP 80-89 MM HG: CPT | Performed by: INTERNAL MEDICINE

## 2025-03-03 RX ORDER — METHOTREXATE 2.5 MG/1
10 TABLET ORAL WEEKLY
Qty: 68 TABLET | Refills: 0 | Status: SHIPPED | OUTPATIENT
Start: 2025-03-03 | End: 2025-07-01

## 2025-03-03 RX ORDER — FOLIC ACID 1 MG/1
1 TABLET ORAL DAILY
Qty: 120 TABLET | Refills: 0 | Status: SHIPPED | OUTPATIENT
Start: 2025-03-03 | End: 2025-07-01

## 2025-03-03 RX ORDER — PREDNISONE 1 MG/1
TABLET ORAL
Qty: 330 TABLET | Refills: 0 | Status: SHIPPED | OUTPATIENT
Start: 2025-04-07 | End: 2025-06-06

## 2025-03-03 ASSESSMENT — ENCOUNTER SYMPTOMS
LOSS OF SENSATION IN FEET: 0
DEPRESSION: 0
OCCASIONAL FEELINGS OF UNSTEADINESS: 1

## 2025-03-03 ASSESSMENT — PAIN SCALES - GENERAL: PAINLEVEL_OUTOF10: 0-NO PAIN

## 2025-03-03 NOTE — PATIENT INSTRUCTIONS
Please get labs done today and then get them again one week before your follow up     Continue the taper as you started, 8mg till you are done with the 30 days then 7mg for 30 days then 6mg for 30 days then 5mg for 30 days.     Continue the methotrexate, folic acid and fosamax     I have referred you to physical therapy for your back and for strengthening    Keep your appointment for the EMG (nerve test) and see endocrinology for the calcium levels.

## 2025-03-03 NOTE — PROGRESS NOTES
"Subjective   Patient ID: [id number]  Pat Delarosa is a 79 y.o. female who presents for follow up    HPI  HPI  PMH/PSH: HTN, HyperPTH s/p parathyroidectomy, hypothyroidism, lumbar DDD, R sided macular degeneration, CKD?  Social: Nonsmoker, no alcohol, no drug use. Retired, was a , retired 10 years ago. Had 4 children but lost 2   FHx: No family history of autoimmune diseases     Recall from  and [facility] records 09/2024:  She was seeing Dr. Hansen x11 years for seropositive RA and was on HCQ.      Patient cannot recall how her symptoms started before diagnosis but thinks her arms and legs were sore and was then diagnosed with inflammatory arthritis and PMR 12/2019 by Dr. Boggs and started on prednisone.      She was treated with methotrexate and Enbrel initially which were ineffective and then was switched to Actemra. She is unsure how the effective the Actemra was \"I was still having weakness for my legs, they were working but I also think they weren't\" Her Actemra infusions were later switched to [facility] infusion center which was too far so she stopped taking the infusions.   She reports she was doing well off Actemra until August 2024 (all of a sudden she had severe pain in neck, shoulders and hip area associated with stiffness), getting out of bed was extremely a chore. She lost 15 lbs (140 to 125 lb) Went to her primary care doctor and they prescribed her prednisone 8/15/2024. 60 mg for 2 days, 50 mg for 2 days, 10 mg every 2 days. Her last dose was 8/27/2024. That significantly helped her symptoms. She reported recurrence of symptoms within 2 days of stopping the prednisone. She was then seen at [facility] rheumatology and restarted on prednisone 20mg for 6 weeks and her symptoms improved but she reported b/l thigh weakness and stiffness recurred when the prednisone was off (per notes she was supposed to taper it off, unclear if she did the taper, is forgetful) so it was increased again to " 20mg.     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 This makes a   diagnosis of hereditary hemochromatosis very unlikely.      09/06/2024 at [facility]:  ESR and CRP normal   CCP 41, RF neg, mildy elevated free kappa w/o m protein     Current meds:  Prednisone 7mg (tapered from 20mg)  Methotrexate 10mg/week + FA (1/6/2025)  Fosamax weekly since 05/2021  Vitamin D 2000 international units daily      Prior meds:  Methotrexate  HCQ  Enbrel   Actemra      1/6/2025, she reported again significant prednisone response and was placed on a prednisone taper.   She reports ongoing improvement with b/l hip pain, buttock pain, shoulder and arm pain and stiffness (100%) while on the prednisone taper. She is back to full functionality.     She reports her legs below the knees feel weak when she is going up the stairs, going down the stairs is fine. No knee pain or swelling. No numbness/tingling. This has been unchanged with higher or lower doses of prednisone.     Specifically asked the patient about pain/swelling/ in MCPs and PIPs and wrists, knees, ankles and MTPs and she denied it even prior to being on prednisone     Every once in a while she gets locking in the third and 4th digit with overuse (such as when she is doing cookies), not happening as often    No fever, chills, weight loss, night sweats or headaches. No dry eyes, blurry vision, redness or pain or photophobia. No dry mouth, dental loss, loss of taste, nasal or oral ulcers, difficulty swallowing. No chest pain, palpitations, orthopnea. No shortness of breath, cough. No dysphagia, nausea, vomiting, heartburn, abdominal pain, constipation, diarrhea, melena or  hematochezia  No genital or anal ulcers. No photosensitivity, rash or lesions, Raynaud's phenomenon. No numbness or tingling.     Patient Active Problem List   Diagnosis    Abnormal laboratory test result    Depression    Dyslipidemia    Benign hypertension    Gastroesophageal reflux disease    Hypercalcemia    Hyperglycemia    Hypothyroidism (acquired)    IgG deficiency (Multi)    Macrocytosis    Menopausal symptom    Osteoporosis    Rheumatoid arthritis involving multiple sites with positive rheumatoid factor (Multi)    Vitamin D deficiency    Age-related osteoporosis without current pathological fracture    Oropharyngeal dysphagia    Stage 3a chronic kidney disease (Multi)    Exudative age-related macular degeneration, right eye, with active choroidal neovascularization    Peripheral vascular disease, unspecified (CMS-HCC)    Postprocedural hypoparathyroidism (Multi)       Past Medical History:   Diagnosis Date    CKD (chronic kidney disease) stage 2, GFR 60-89 ml/min 09/03/2023       History reviewed. No pertinent surgical history.    Social History     Socioeconomic History    Marital status:      Spouse name: Not on file    Number of children: Not on file    Years of education: Not on file    Highest education level: Not on file   Occupational History    Not on file   Tobacco Use    Smoking status: Former     Types: Cigarettes    Smokeless tobacco: Never   Vaping Use    Vaping status: Never Used   Substance and Sexual Activity    Alcohol use: Not Currently    Drug use: Never    Sexual activity: Defer   Other Topics Concern    Not on file   Social History Narrative    Not on file     Social Drivers of Health     Financial Resource Strain: Not on file   Food Insecurity: Not on file   Transportation Needs: Not on file   Physical Activity: Not on file   Stress: Not on file   Social Connections: Not on file   Intimate Partner Violence: Not on file   Housing Stability: Not on file       No Known  Allergies      Current Outpatient Medications:     alendronate (Fosamax) 70 mg tablet, TAKE 1 TABLET BY MOUTH WEEKLY  WITH 8 OZ OF PLAIN WATER 30  MINUTES BEFORE FIRST FOOD, DRINK OR MEDS. STAY UPRIGHT FOR 30  MINS, Disp: 12 tablet, Rfl: 3    amLODIPine (Norvasc) 10 mg tablet, TAKE 1 TABLET BY MOUTH ONCE  DAILY, Disp: 100 tablet, Rfl: 2    folic acid (Folvite) 1 mg tablet, Take 1 tablet (1 mg) by mouth once daily., Disp: 90 tablet, Rfl: 3    folic acid (Folvite) 1 mg tablet, Take 1 tablet (1 mg) by mouth once daily., Disp: 30 tablet, Rfl: 5    levothyroxine (Synthroid, Levoxyl) 50 mcg tablet, TAKE 1 TABLET BY MOUTH ON AN  EMPTY STOMACH ONCE DAILY IN THE  MORNING, Disp: 100 tablet, Rfl: 1    lisinopriL-hydrochlorothiazide 20-12.5 mg tablet, Take 1 tablet by mouth once daily., Disp: 90 tablet, Rfl: 2    methotrexate (Trexall) 2.5 mg tablet, Take 4 tablets (10 mg total) by mouth 1 (one) time per week.  Follow directions carefully, and ask to explain any part you do not understand. Take exactly as directed., Disp: 48 tablet, Rfl: 0    predniSONE (Deltasone) 1 mg tablet, Take 9 tablets (9 mg) by mouth once daily for 30 days, THEN 8 tablets (8 mg) once daily for 30 days, THEN 7 tablets (7 mg) once daily., Disp: 720 tablet, Rfl: 0    Objective     Visit Vitals  /82 (BP Location: Right arm, Patient Position: Sitting)   Pulse 88     Physical Exam  Copied from previous exam  General: AAOx3, Cooperative  Head: normocephalic, atraumatic  Eyes: EOMI, conjunctiva clear, sclera white, anicteric  Throat/Mouth: No oral deformities, no cheek swelling, mucosa appear dry, no oral ulcers noted or loss of dentition   Neck/Lymph: FROM, trachea midline  Lungs: chest expansion symmetric. No respiratory distress.   Heart: RRR  Neuro: CN II-XII grossly intact, no focal deficit  L hip proximal weakness 3+/5 on the L, 4/5 on the R, rest of muscle strength is 5/5 proximal and distal.  Skin: No rashes, ulcers or photosensitive areas  MSK:  Upper Extremities:  Hand/Fingers: No erythema, swelling, tenderness or warmth at DIP, PIP, or MCP joints, FROM grossly. Good hand . No nodules. No deformities + OA changes  Wrists: No erythema, swelling, warmth or tenderness at wrist, FROM grossly  Elbows: No tenderness, swelling, erythema or warmth at elbows, FROM grossly. No nodules   Shoulders: No swelling, erythema, tenderness or warmth at shoulders. FROM  Lower Extremities:   Hips: No obvious deformities. No joint tenderness, normal ROM grossly. Log roll test negative bilaterally. Sourav test is negative bilaterally but with mild limitation. No trochanteric bursae TTP  Knees: No tenderness, deformities, swelling, rashes, or warmth, normal ROM grossly. No crepitus, no pes anserine bursa TTP   Ankles: No deformities, tenderness, edema, erythema, ulceration, or warmth at the ankle  Feet: Negative MTP squeeze. Normal ROM grossly.       Lab Results   Component Value Date    WBC 16.9 (H) 01/06/2025    HGB 14.6 01/06/2025    HCT 43.0 01/06/2025    MCV 97 01/06/2025     01/06/2025       Chemistry    Lab Results   Component Value Date/Time     01/06/2025 0943    K 4.6 01/06/2025 0943     01/06/2025 0943    CO2 27 01/06/2025 0943    BUN 36 (H) 01/06/2025 0943    CREATININE 1.21 (H) 01/06/2025 0943    Lab Results   Component Value Date/Time    CALCIUM 10.6 (H) 01/06/2025 0943    ALKPHOS 48 01/06/2025 0943    AST 23 01/06/2025 0943    ALT 28 01/06/2025 0943    BILITOT 0.6 01/06/2025 0943          Lab Results   Component Value Date    CRP <0.10 01/06/2025    SPEP Normal. 01/06/2025    PTH 68 (H) 07/07/2023    CALCIUM 10.6 (H) 01/06/2025    CAION 1.25 04/09/2020    PHOS 3.5 01/03/2020     Alkaline Phosphatase   Date Value Ref Range Status   01/06/2025 48 33 - 136 U/L Final     AST   Date Value Ref Range Status   01/06/2025 23 9 - 39 U/L Final     Urea Nitrogen   Date Value Ref Range Status   01/06/2025 36 (H) 6 - 23 mg/dL Final     Sodium   Date Value  Ref Range Status   01/06/2025 139 136 - 145 mmol/L Final     Potassium   Date Value Ref Range Status   01/06/2025 4.6 3.5 - 5.3 mmol/L Final     Bicarbonate   Date Value Ref Range Status   01/06/2025 27 21 - 32 mmol/L Final     ALT   Date Value Ref Range Status   01/06/2025 28 7 - 45 U/L Final     Comment:     Patients treated with Sulfasalazine may generate falsely decreased results for ALT.     Vitamin D, 25-Hydroxy, Total   Date Value Ref Range Status   05/03/2024 52 31 - 100 ng/mL Final       MR lumbar spine wo IV contrast  Narrative: Interpreted By:  Rigoberto Collier,   STUDY:  MR LUMBAR SPINE WO IV CONTRAST;  1/27/2025 9:16 am      INDICATION:  Signs/Symptoms:patient with multilevel DDD, has b/l LE weakness, CPK  and aldolase normal, objectively unilateral proximal LLE weakness..  ,R29.898 Other symptoms and signs involving the musculoskeletal system      COMPARISON:  None.      ACCESSION NUMBER(S):  [id number]      ORDERING CLINICIAN:  HANNY DE LUNA      TECHNIQUE:  The lumbar spine was studied in the sagital, axial and coronal planes  utiliing T1 and T2 weighted images.      FINDINGS:  The marrow signal is slightly heterogeneous. Mild discogenic Modic  type 2 marrow signal changes are present at the L2/L3 interspace.  Vertebral body height are normal. The conus and sacrum are normal.  Images at each interspace reveal the following: T12/L1  There is normal alignment and vertebral body height. The disc space  is normal. There is no evidence of canal or foraminal narrowing.  There is no evidence of bulging or herniated disc. L1/L2  Circumferential bulging intervertebral disc. Bilateral facet  hypertrophy. Flattening of the thecal sac which measures a proximally  7 mm in AP dimension in the midline. Advanced bilateral foraminal  narrowing without focal disc herniation. L2/L3  Circumferential bulging intervertebral disc. Modic type 2 discogenic  marrow signal changes in the adjacent endplates. No measurable  canal  stenosis. Moderate/advanced bilateral foraminal narrowing. L3/L4  Circumferential bulging intervertebral disc. Bilateral facet  hypertrophy. Flattening of the thecal sac which measures a proximally  7 mm in AP dimension in the midline. Bilateral facet hypertrophy and  severe bilateral foraminal stenosis. L4/L5  Circumferential bulging intervertebral disc. Bilateral facet  hypertrophy. No measurable canal stenosis. Moderate/severe bilateral  foraminal narrowing. L5/S1  Circumferential bulging intervertebral disc asymmetrical to the  right. Bilateral facet hypertrophy. No measurable canal stenosis.  Right worse than left foraminal narrowing.          Impression: * Lumbar spondylosis with canal and foraminal narrowing as described      MACRO:  none      Signed by: Rigoberto Collier 1/27/2025 9:25 AM  Dictation workstation:   [id number]    XR hips, lumbar spine at [facility] 09/2024  Lumbar spine:   Counting reference:  Lumbosacral junction.  For the purposes of this   report,  L4-5 is considered the level of the iliac crest and assume there   are 5 lumbar-type vertebrae.  Anatomic variant:  None.     The vertebral body heights are preserved.  Dextroscoliosis centered at   L2-L3 is present.  Multilevel degenerative disc disease with endplate   marginal osteophytes and intervertebral disc space narrowing most   prominently at L2-L3 which is severe with vacuum disc phenomenon in the   anterior aspect of the intervertebral disc space, mild otherwise in the   remaining levels.  Mild retrolisthesis of L2 on L3 and L3 on L4 is   present.  Moderate facet arthropathy in the lower lumbar spine is   present, most prominently at L4-5 and L5-S1. Atherosclerotic   calcification is noted in the aorta.     Sacroiliac joints:   Symmetric mild degenerative changes in the bilateral sacroiliac joints   are noted.  No abnormal widening, cortical indistinctness, or erosive   changes is seen to suggest sacroiliitis.     Hips:   Symmetric  axial joint space narrowing, mild, with associated mild   marginal osteophyte formation is seen bilaterally.  No juxta-articular   bony erosive changes are noted. The symphysis pubis demonstrates mild   degenerative changes.  There is chondrocalcinosis at the symphysis pubis.    There is no acute fracture or dislocation.  Incidental note is made of   vascular calcifications in the femoral arteries bilaterally.      Hand XR 09/2024 at [facility]  RESULT:     Note is made of proximal interphalangeal and distal interphalangeal joint   degenerative changes which are overall mild.   Moderate second   metacarpophalangeal and third metacarpophalangeal joint space narrowing   on the RIGHT side is present.  Joint capsule calcification is seen in the   LEFT second and RIGHT second and third metacarpophalangeal joint   capsules.  Triangular fibrocartilage chondrocalcinosis is present   bilaterally.  No juxta-articular bony erosive changes are noted.      DEXA 01/2024  FINDINGS:  SPINE L1-L4  Bone Mineral Density: 1.392 g/cm2  T-Score 3.1  Z-Score 5.7  Bone Mineral Density change vs baseline:  16.9 %  Bone Mineral Density change vs previous: 9.9 %      LEFT FEMUR -TOTAL  Bone Mineral Density: 0.836 g/cm2  T-Score -0.9   Z-Score  1.1  Bone Mineral Density change vs baseline: -9.8 %  Bone Mineral Density change vs previous: -7.5 %      LEFT FEMUR -NECK  Bone Mineral Density: 0.673 g/cm2  T-Score -1.6  Z-Score 0.7     DEXA 04/2021  IMPRESSION:     BMD of lumbar spine is in the normal range with T-score of 2.0.  Significant interval increase in bone mineral density since the baseline by  6.3%. No significant change since the previous.  BMD of the LEFT total hip is in the normal range with T score of -0.3.   No  significant changes in bone marrow density.  BMD of the femoral neck is in osteopenic range with T-score of -1.6 .  BMD of the left forearm is in the normal range with T score of 0.2.  BMD of the left forearm UD is in the normal  range with T score of -0.1.     XR ankle right 2016  IMPRESSION:     BMD of lumbar spine is in the normal range with T-score of 2.0.  Significant interval increase in bone mineral density since the baseline by  6.3%. No significant change since the previous.  BMD of the LEFT total hip is in the normal range with T score of -0.3.   No  significant changes in bone marrow density.  BMD of the femoral neck is in osteopenic range with T-score of -1.6 .  BMD of the left forearm is in the normal range with T score of 0.2.  BMD of the left forearm UD is in the normal range with T score of -0.1.    Assessment/Plan   A 79 year old F here for follow up.      She has a previous diagnosis of RA and PMR. She has some difficulty providing a detailed medical history but Dr. Boggs's office notes, labs from [facility] were reviewed.      Patient reports a classic PMR picture with pelvic and shoulder girdle pain/stiffness and geling phenomenon that significantly resolved with prednisone with recurrence in her symptoms once prednisone was discontinued.   Today she reports 100% resolution in pelvic and shoulder girdle symptoms while on 20mg of prednisone even with tapering of prednisone.      She also has a positive ACPA from [facility] records, but reports no episodes of pain or swelling in the peripheral joints. Physical exam without evidence of peripheral synovitis but patient is on 10mg of prednisone so this will need to be monitored closely while further tapering down prednisone.   Testing done at  with low titer RF, negative ACPA  On review of prior imaging performed at [facility], she has chondrocalcinosis and moderate narrowing of 2nd and 3rd MCP, chondrocalcinosis at symphysis pubis and hip osteoarthritis.   PTH is normal, magnesium and phosphorus are normal. She is heterozygous carrier of the H63D variant, making a diagnosis of hemochromatosis unlikley.     MR hip 11/25/2024 with b/l moderate OA, an asymmetric small left hip joint effusion.   mild SI OA, mild pubic symphysis OA,  Mild left-sided gluteus minimus tendinosis without tear. There also  bilateral hamstring origin tendinosis, mild, with low-grade tearing.     At initial visit, she reported left proximal LE weakness >R, has chronic low back pain,   MR lumbar spine 1/27/2025 with lumbar spondylosis   CPK and aldolase are normal. EMG of LLE pending.     Impression: PMR, doing well on prednisone taper   Also concern for inflammatory arthritis with low titer RF, positive ACPA at [facility] previously, chondrocalcinosis   Flexor stenosing tenosynovitis of L hand    Labs from 1/6/2025 reviewed, lumbar MR reviewed.   SPEP has normalized, previously mildy elevated free kappa w/o m protein    - Labs today  - Continue to taper prednisone by 1mg qmonth   - Continue methotrexate 10mg/week + FA.   - Counseled on flexor stenosing tenosynovitis, voltaren gel and splinting   - EMG pending for lumbar spondylosis and LE weakness  - Refer to PT for lumbar spondylosis and strengthening     Osteopenia with high FRAX  - continue fosamax and vitamin D   - Needs DEXA 01/2026, she will be on fosamax for 5 years, will discuss drug holiday at that time vs continuation of BP    Referred to endocrinology for hypercalcemia     RTC in 3 months     Sang More MD  Division of Rheumatology   [facility]

## 2025-03-04 LAB
ALBUMIN SERPL-MCNC: 4.9 G/DL (ref 3.6–5.1)
ALP SERPL-CCNC: 51 U/L (ref 37–153)
ALT SERPL-CCNC: 30 U/L (ref 6–29)
ANION GAP SERPL CALCULATED.4IONS-SCNC: 14 MMOL/L (CALC) (ref 7–17)
AST SERPL-CCNC: 33 U/L (ref 10–35)
BASOPHILS # BLD AUTO: 38 CELLS/UL (ref 0–200)
BASOPHILS NFR BLD AUTO: 0.3 %
BILIRUB SERPL-MCNC: 0.7 MG/DL (ref 0.2–1.2)
BUN SERPL-MCNC: 36 MG/DL (ref 7–25)
CALCIUM SERPL-MCNC: 10 MG/DL (ref 8.6–10.4)
CHLORIDE SERPL-SCNC: 100 MMOL/L (ref 98–110)
CO2 SERPL-SCNC: 23 MMOL/L (ref 20–32)
CREAT SERPL-MCNC: 1.43 MG/DL (ref 0.6–1)
CRP SERPL-MCNC: <3 MG/L
EGFRCR SERPLBLD CKD-EPI 2021: 37 ML/MIN/1.73M2
EOSINOPHIL # BLD AUTO: 0 CELLS/UL (ref 15–500)
EOSINOPHIL NFR BLD AUTO: 0 %
ERYTHROCYTE [DISTWIDTH] IN BLOOD BY AUTOMATED COUNT: 14.3 % (ref 11–15)
ERYTHROCYTE [SEDIMENTATION RATE] IN BLOOD BY WESTERGREN METHOD: 17 MM/H
GLUCOSE SERPL-MCNC: 150 MG/DL (ref 65–139)
HCT VFR BLD AUTO: 38.5 % (ref 35–45)
HGB BLD-MCNC: 13 G/DL (ref 11.7–15.5)
LYMPHOCYTES # BLD AUTO: 2413 CELLS/UL (ref 850–3900)
LYMPHOCYTES NFR BLD AUTO: 19.3 %
MCH RBC QN AUTO: 33.3 PG (ref 27–33)
MCHC RBC AUTO-ENTMCNC: 33.8 G/DL (ref 32–36)
MCV RBC AUTO: 98.7 FL (ref 80–100)
MONOCYTES # BLD AUTO: 438 CELLS/UL (ref 200–950)
MONOCYTES NFR BLD AUTO: 3.5 %
NEUTROPHILS # BLD AUTO: 9613 CELLS/UL (ref 1500–7800)
NEUTROPHILS NFR BLD AUTO: 76.9 %
PLATELET # BLD AUTO: 325 THOUSAND/UL (ref 140–400)
PMV BLD REES-ECKER: 9.9 FL (ref 7.5–12.5)
POTASSIUM SERPL-SCNC: 5.8 MMOL/L (ref 3.5–5.3)
PROT SERPL-MCNC: 7 G/DL (ref 6.1–8.1)
RBC # BLD AUTO: 3.9 MILLION/UL (ref 3.8–5.1)
SODIUM SERPL-SCNC: 137 MMOL/L (ref 135–146)
WBC # BLD AUTO: 12.5 THOUSAND/UL (ref 3.8–10.8)

## 2025-03-06 ENCOUNTER — OFFICE VISIT (OUTPATIENT)
Dept: PRIMARY CARE | Facility: CLINIC | Age: 80
End: 2025-03-06
Payer: MEDICARE

## 2025-03-06 VITALS
HEART RATE: 58 BPM | SYSTOLIC BLOOD PRESSURE: 126 MMHG | OXYGEN SATURATION: 99 % | TEMPERATURE: 97 F | DIASTOLIC BLOOD PRESSURE: 62 MMHG | BODY MASS INDEX: 25.06 KG/M2 | WEIGHT: 137 LBS

## 2025-03-06 DIAGNOSIS — E78.5 DYSLIPIDEMIA: ICD-10-CM

## 2025-03-06 DIAGNOSIS — E03.9 HYPOTHYROIDISM (ACQUIRED): ICD-10-CM

## 2025-03-06 DIAGNOSIS — I10 BENIGN HYPERTENSION: ICD-10-CM

## 2025-03-06 DIAGNOSIS — Z00.00 MEDICARE ANNUAL WELLNESS VISIT, SUBSEQUENT: Primary | ICD-10-CM

## 2025-03-06 DIAGNOSIS — N18.32 STAGE 3B CHRONIC KIDNEY DISEASE (MULTI): ICD-10-CM

## 2025-03-06 DIAGNOSIS — R73.9 HYPERGLYCEMIA: ICD-10-CM

## 2025-03-06 DIAGNOSIS — R60.0 PEDAL EDEMA: ICD-10-CM

## 2025-03-06 PROCEDURE — 3078F DIAST BP <80 MM HG: CPT | Performed by: INTERNAL MEDICINE

## 2025-03-06 PROCEDURE — G0439 PPPS, SUBSEQ VISIT: HCPCS | Performed by: INTERNAL MEDICINE

## 2025-03-06 PROCEDURE — 99213 OFFICE O/P EST LOW 20 MIN: CPT | Performed by: INTERNAL MEDICINE

## 2025-03-06 PROCEDURE — 99397 PER PM REEVAL EST PAT 65+ YR: CPT | Mod: 25 | Performed by: INTERNAL MEDICINE

## 2025-03-06 PROCEDURE — 99215 OFFICE O/P EST HI 40 MIN: CPT | Performed by: INTERNAL MEDICINE

## 2025-03-06 PROCEDURE — 1126F AMNT PAIN NOTED NONE PRSNT: CPT | Performed by: INTERNAL MEDICINE

## 2025-03-06 PROCEDURE — 99397 PER PM REEVAL EST PAT 65+ YR: CPT | Performed by: INTERNAL MEDICINE

## 2025-03-06 PROCEDURE — 99213 OFFICE O/P EST LOW 20 MIN: CPT | Mod: 25 | Performed by: INTERNAL MEDICINE

## 2025-03-06 PROCEDURE — 1157F ADVNC CARE PLAN IN RCRD: CPT | Performed by: INTERNAL MEDICINE

## 2025-03-06 PROCEDURE — 1036F TOBACCO NON-USER: CPT | Performed by: INTERNAL MEDICINE

## 2025-03-06 PROCEDURE — 3074F SYST BP LT 130 MM HG: CPT | Performed by: INTERNAL MEDICINE

## 2025-03-06 PROCEDURE — 1159F MED LIST DOCD IN RCRD: CPT | Performed by: INTERNAL MEDICINE

## 2025-03-06 RX ORDER — FUROSEMIDE 20 MG/1
10 TABLET ORAL DAILY PRN
Qty: 90 TABLET | Refills: 0 | Status: SHIPPED | OUTPATIENT
Start: 2025-03-06 | End: 2026-03-06

## 2025-03-06 RX ORDER — LISINOPRIL 30 MG/1
30 TABLET ORAL DAILY
Qty: 100 TABLET | Refills: 1 | Status: SHIPPED | OUTPATIENT
Start: 2025-03-06 | End: 2026-04-10

## 2025-03-06 ASSESSMENT — ENCOUNTER SYMPTOMS
OCCASIONAL FEELINGS OF UNSTEADINESS: 0
BACK PAIN: 1
DYSURIA: 0
VOMITING: 0
UNEXPECTED WEIGHT CHANGE: 0
COUGH: 0
NUMBNESS: 0
DEPRESSION: 0
NAUSEA: 0
PALPITATIONS: 0
BLOOD IN STOOL: 0
MYALGIAS: 0
ABDOMINAL PAIN: 0
WHEEZING: 0
SEIZURES: 0
TROUBLE SWALLOWING: 0
LOSS OF SENSATION IN FEET: 0
WEAKNESS: 1
TREMORS: 0
SINUS PRESSURE: 0
FREQUENCY: 0
SHORTNESS OF BREATH: 0
FATIGUE: 0
ARTHRALGIAS: 1
CHILLS: 0

## 2025-03-06 ASSESSMENT — PAIN SCALES - GENERAL: PAINLEVEL_OUTOF10: 0-NO PAIN

## 2025-03-06 NOTE — PROGRESS NOTES
Subjective   Patient ID: Pat Delarosa is a 79 y.o. female who presents for Annual Exam.    HPI   Has history of hypertension, rheumatoid arthritis, hypothyroidism        History of hypertension -compliant with medications, denied any chest pain, pedal edema    Seeing rheumatology, diagnosed as PMR  On long taper of prednisone  Stated joint aches much better, feeling back to herself    Refused mammogram this year    Was referred to endocrinology for hypercalcemia by yasir    CKD- stopped hydrochlorothiazide, increased lisinopril, if continues to worsen will give referral to nephro  Going for EMG testing for bilateral lower extremity weakness      Review of Systems   Constitutional:  Negative for chills, fatigue and unexpected weight change.   HENT:  Negative for postnasal drip, sinus pressure and trouble swallowing.    Respiratory:  Negative for cough, shortness of breath and wheezing.    Cardiovascular:  Negative for chest pain, palpitations and leg swelling.   Gastrointestinal:  Negative for abdominal pain, blood in stool, nausea and vomiting.   Genitourinary:  Negative for dysuria and frequency.   Musculoskeletal:  Positive for arthralgias and back pain. Negative for myalgias.   Skin:  Negative for rash.   Neurological:  Positive for weakness. Negative for tremors, seizures and numbness.   Psychiatric/Behavioral:  Negative for behavioral problems and suicidal ideas.        Objective   /62 (BP Location: Left arm, Patient Position: Sitting, BP Cuff Size: Adult)   Pulse 58   Temp 36.1 °C (97 °F) (Temporal)   Wt 62.1 kg (137 lb)   SpO2 99%   BMI 25.06 kg/m²     Physical Exam  Constitutional:       General: She is not in acute distress.  HENT:      Head: Normocephalic and atraumatic.      Right Ear: Tympanic membrane normal.      Left Ear: Tympanic membrane normal.   Eyes:      Extraocular Movements: Extraocular movements intact.      Conjunctiva/sclera: Conjunctivae normal.   Cardiovascular:      Rate and  Rhythm: Normal rate and regular rhythm.      Pulses: Normal pulses.      Heart sounds: No murmur heard.  Pulmonary:      Effort: Pulmonary effort is normal.      Breath sounds: Normal breath sounds. No wheezing or rales.   Abdominal:      General: Bowel sounds are normal.      Palpations: Abdomen is soft. There is no mass.      Tenderness: There is no abdominal tenderness. There is no guarding.   Musculoskeletal:      Comments: Ankle edema bilateral   Lymphadenopathy:      Cervical: No cervical adenopathy.   Skin:     Findings: No rash.   Neurological:      General: No focal deficit present.      Mental Status: She is alert and oriented to person, place, and time.      Cranial Nerves: No cranial nerve deficit.      Gait: Gait normal.   Psychiatric:         Mood and Affect: Mood normal.         Assessment/Plan        Pat was seen today for annual exam.  Diagnoses and all orders for this visit:  Medicare annual wellness visit, subsequent (Primary)  Hypothyroidism (acquired)  -     TSH with reflex to Free T4 if abnormal; Future  -     TSH with reflex to Free T4 if abnormal  Dyslipidemia  -     Lipid Panel; Future  -     Lipid Panel  Benign hypertension  -     lisinopril 30 mg tablet; Take 1 tablet (30 mg) by mouth once daily.  Stage 3b chronic kidney disease (Multi)  Pedal edema  -     furosemide (Lasix) 20 mg tablet; Take 0.5 tablets (10 mg) by mouth once daily as needed (pedal edema).  Hyperglycemia  -     Hemoglobin A1C; Future  -     Hemoglobin A1C      Reviewed recent labs, and rheumatology notes    Worsening GFR, stopped lisinopril 20-12.5 hydrochlorothiazide, started on lisinopril 30 mg and Lasix  Avoid NSAID's    Lab Results   Component Value Date    WBC 12.5 (H) 03/03/2025    HGB 13.0 03/03/2025    HCT 38.5 03/03/2025    MCV 98.7 03/03/2025     03/03/2025     Lab Results   Component Value Date    GLUCOSE 150 (H) 03/03/2025    CALCIUM 10.0 03/03/2025     03/03/2025    K 5.8 (H) 03/03/2025    CO2  "23 03/03/2025     03/03/2025    BUN 36 (H) 03/03/2025    CREATININE 1.43 (H) 03/03/2025     Lab Results   Component Value Date    TSH 0.94 05/03/2024       Lab Results   Component Value Date    WBC 12.5 (H) 03/03/2025    HGB 13.0 03/03/2025    HCT 38.5 03/03/2025    MCV 98.7 03/03/2025     03/03/2025     Lab Results   Component Value Date    GLUCOSE 150 (H) 03/03/2025    CALCIUM 10.0 03/03/2025     03/03/2025    K 5.8 (H) 03/03/2025    CO2 23 03/03/2025     03/03/2025    BUN 36 (H) 03/03/2025    CREATININE 1.43 (H) 03/03/2025     Lab Results   Component Value Date    CHOL 171 05/03/2024    CHOL 195 06/07/2023    CHOL 176 08/29/2022     Lab Results   Component Value Date    HDL 81.0 05/03/2024     06/07/2023    HDL 95 08/29/2022     Lab Results   Component Value Date    LDLCALC 79 05/03/2024    LDLCALC 66 06/07/2023    LDLCALC 71 08/29/2022     Lab Results   Component Value Date    TRIG 53 05/03/2024    TRIG 75 06/07/2023    TRIG 51 08/29/2022     No components found for: \"CHOLHDL\"      XR HIP BILATERAL 5V PEL/AP/LAT EACH HIP  Order: [id number]  Impression    IMPRESSION:    No radiographic findings of inflammatory arthropathy    Osteoarthritis of the hips bilaterally    Mild degenerative arthritis of the sacroiliac joints    Moderate lumbar spondylosis most prominently at the L2-L3 level.          : REED    Transcribe Date/Time: Sep  8 2024  4:00P    Dictated by : DAMION STEWART MD    This examination was interpreted and the report reviewed and  electronically signed by:  DAMION STEWART MD on Sep  8 2024  4:03PM  EST  Narrative    * * *Final Report* * *    DATE OF EXAM: Sep  7 2024  8:49AM      [facility]   5353  -  XR HIP DIANE 5V PEL+ AP/LAT EA HIP  / ACCESSION #  [id number]    PROCEDURE REASON: Polymyalgia rheumatica (HCC)        * * * * Physician Interpretation * * * *     REASON FOR STUDY:  Polymyalgia rheumatica (HCC)   .    PATIENT/TECHNOLOGIST-PROVIDED HISTORY:  RHEUMATOID " ARTHRITIS      TECHNIQUE: XR LUMBAR 3V AP/LAT/L5-S1, XR SI JTS 2V AP PELV/ANTHONY, XR  HIP DIANE 5V PEL+ AP/LAT EA HIP     Laterality:  NOT APPLICABLE (accession [id number]), NOT APPLICABLE  (accession [id number]), BILATERAL (accession [id number])     Number of different views (projections): 3 (accession [id number]), 2  (accession [id number])    COMPARISON: None    RESULT:    Lumbar spine:    Counting reference:  Lumbosacral junction.  For the purposes of this  report,  L4-5 is considered the level of the iliac crest and assume there  are 5 lumbar-type vertebrae.  Anatomic variant:  None.    The vertebral body heights are preserved.  Dextroscoliosis centered at   L2-L3 is present.  Multilevel degenerative disc disease with endplate  marginal osteophytes and intervertebral disc space narrowing most  prominently at L2-L3 which is severe with vacuum disc phenomenon in the  anterior aspect of the intervertebral disc space, mild otherwise in the  remaining levels.  Mild retrolisthesis of L2 on L3 and L3 on L4 is  present.  Moderate facet arthropathy in the lower lumbar spine is  present, most prominently at L4-5 and L5-S1. Atherosclerotic  calcification is noted in the aorta.    Sacroiliac joints:    Symmetric mild degenerative changes in the bilateral sacroiliac joints  are noted.  No abnormal widening, cortical indistinctness, or erosive  changes is seen to suggest sacroiliitis.    Hips:    Symmetric axial joint space narrowing, mild, with associated mild  marginal osteophyte formation is seen bilaterally.  No juxta-articular  bony erosive changes are noted. The symphysis pubis demonstrates mild  degenerative changes.  There is chondrocalcinosis at the symphysis pubis.   There is no acute fracture or dislocation.  Incidental note is made of  vascular calcifications in the femoral arteries bilaterally.    No other significant abnormality is seen.  ---------------------------------------------  Exam End: 09/07/24 08:49     Specimen Collected: 09/07/24 08:49 Last Resulted: 09/08/24 16:05   Received From: [facility]  Result Received: 10/08/24 02:53      Current Outpatient Medications   Medication Instructions    alendronate (Fosamax) 70 mg tablet TAKE 1 TABLET BY MOUTH WEEKLY  WITH 8 OZ OF PLAIN WATER 30  MINUTES BEFORE FIRST FOOD, DRINK OR MEDS. STAY UPRIGHT FOR 30  MINS    amLODIPine (NORVASC) 10 mg, oral, Daily    folic acid (FOLVITE) 1 mg, oral, Daily    folic acid (FOLVITE) 1 mg, oral, Daily    furosemide (LASIX) 10 mg, oral, Daily PRN    levothyroxine (Synthroid, Levoxyl) 50 mcg tablet TAKE 1 TABLET BY MOUTH ON AN  EMPTY STOMACH ONCE DAILY IN THE  MORNING    lisinopril 30 mg, oral, Daily    methotrexate (TREXALL) 10 mg, oral, Weekly, Follow directions carefully, and ask to explain any part you do not understand. Take exactly as directed.    predniSONE (Deltasone) 1 mg tablet Take 9 tablets (9 mg) by mouth once daily for 30 days, THEN 8 tablets (8 mg) once daily for 30 days, THEN 7 tablets (7 mg) once daily.    [START ON 4/7/2025] predniSONE (Deltasone) 1 mg tablet Take 6 tablets (6 mg) by mouth once daily for 30 days, THEN 5 tablets (5 mg) once daily. Do not fill before April 7, 2025.

## 2025-03-19 DIAGNOSIS — I10 BENIGN HYPERTENSION: ICD-10-CM

## 2025-03-20 RX ORDER — AMLODIPINE BESYLATE 10 MG/1
10 TABLET ORAL DAILY
Qty: 100 TABLET | Refills: 1 | Status: SHIPPED | OUTPATIENT
Start: 2025-03-20

## 2025-04-03 ENCOUNTER — HOSPITAL ENCOUNTER (OUTPATIENT)
Facility: CLINIC | Age: 80
Discharge: HOME | End: 2025-04-03
Payer: MEDICARE

## 2025-04-03 DIAGNOSIS — R29.898 WEAKNESS OF LEFT HIP: ICD-10-CM

## 2025-04-03 PROCEDURE — 95909 NRV CNDJ TST 5-6 STUDIES: CPT | Performed by: STUDENT IN AN ORGANIZED HEALTH CARE EDUCATION/TRAINING PROGRAM

## 2025-04-03 PROCEDURE — 95886 MUSC TEST DONE W/N TEST COMP: CPT | Performed by: STUDENT IN AN ORGANIZED HEALTH CARE EDUCATION/TRAINING PROGRAM

## 2025-04-19 NOTE — PROGRESS NOTES
HPI   80 yo presents as new pt for elevated calcium.  Pt with htn, RA, CKD, hypothyroidism, PMR (seeing rheum).    Hypercalcemia  -had been on hydrochlorothiazide, came off early 2025  -nov 2024: pth-26, calcium ionized-1.3, calcium-10.8, alk phos-72  -Jan 2025 ca-10.6  -came off hydrochlorothiazide and lisinopril was increased  March 2025 calcium-10.0, potassium was 5.8  -pt was on lasix and currently not on it    Dexa scan jan 22, 2024: L spine +3.1, L femur -0.9, L fem neck -1.6  -no hx of bone fractures  -no hx of kidney stones  -no FH of the above    Pmh/psh:  -as stated above  -hysterectomy   -thyroidectomy    SH:  -neg tobacco, occ alcohol    FH:  Mom-ovarian cancer  Dad-unknown hx      Current Outpatient Medications:     amLODIPine (Norvasc) 10 mg tablet, TAKE 1 TABLET BY MOUTH ONCE  DAILY, Disp: 100 tablet, Rfl: 1    folic acid (Folvite) 1 mg tablet, Take 1 tablet (1 mg) by mouth once daily., Disp: 120 tablet, Rfl: 0    levothyroxine (Synthroid, Levoxyl) 50 mcg tablet, TAKE 1 TABLET BY MOUTH ON AN  EMPTY STOMACH ONCE DAILY IN THE  MORNING, Disp: 100 tablet, Rfl: 1    lisinopril 30 mg tablet, Take 1 tablet (30 mg) by mouth once daily., Disp: 100 tablet, Rfl: 1    methotrexate (Trexall) 2.5 mg tablet, Take 4 tablets (10 mg total) by mouth 1 (one) time per week.  Follow directions carefully, and ask to explain any part you do not understand. Take exactly as directed., Disp: 68 tablet, Rfl: 0    predniSONE (Deltasone) 1 mg tablet, Take 6 tablets (6 mg) by mouth once daily for 30 days, THEN 5 tablets (5 mg) once daily. Do not fill before April 7, 2025., Disp: 330 tablet, Rfl: 0    alendronate (Fosamax) 70 mg tablet, TAKE 1 TABLET BY MOUTH WEEKLY  WITH 8 OZ OF PLAIN WATER 30  MINUTES BEFORE FIRST FOOD, DRINK OR MEDS. STAY UPRIGHT FOR 30  MINS (Patient not taking: Reported on 4/24/2025), Disp: 12 tablet, Rfl: 3    furosemide (Lasix) 20 mg tablet, Take 0.5 tablets (10 mg) by mouth once daily as needed (pedal  "edema). (Patient not taking: Reported on 4/24/2025), Disp: 90 tablet, Rfl: 0      Allergies as of 04/24/2025    (No Known Allergies)         Review of Systems   Cardiology: Lightheadedness-denies.  Chest pain-denies.  Leg edema-denies.  Palpitations-denies.  Respiratory: Cough-denies. Shortness of breath-denies.  Wheezing-denies.  Gastroenterology: Constipation-denies.  Diarrhea-denies.  Heartburn-denies.  Endocrinology: Cold intolerance-denies.  Heat intolerance-denies.  Sweats-denies.  Neurology: Headache-denies.  Tremor-denies.  Neuropathy in extremities-denies.  Psychology: Low energy-denies.  Irritability-denies.  Sleep disturbances-denies.      /50 (BP Location: Left arm, Patient Position: Sitting)   Pulse (!) 114   Ht 1.575 m (5' 2\")   Wt 61.1 kg (134 lb 12.8 oz)   LMP  (LMP Unknown)   BMI 24.66 kg/m²       Labs:  Lab Results   Component Value Date    WBC 12.5 (H) 03/03/2025    NRBC 0.0 01/06/2025    RBC 3.90 03/03/2025    HGB 13.0 03/03/2025    HCT 38.5 03/03/2025     03/03/2025     Lab Results   Component Value Date    CALCIUM 10.0 03/03/2025    AST 33 03/03/2025    ALKPHOS 51 03/03/2025    BILITOT 0.7 03/03/2025    PROT 7.0 03/03/2025    ALBUMIN 4.9 03/03/2025    GLOB 2.1 06/07/2023    AGR 2.5 06/07/2023     03/03/2025    K 5.8 (H) 03/03/2025     03/03/2025    CO2 23 03/03/2025    ANIONGAP 14 03/03/2025    BUN 36 (H) 03/03/2025    CREATININE 1.43 (H) 03/03/2025    UREACREAUR 33.0 (H) 07/07/2023    GLUCOSE 150 (H) 03/03/2025    ALT 30 (H) 03/03/2025    EGFR 37 (L) 03/03/2025     Lab Results   Component Value Date    CHOL 171 05/03/2024    TRIG 53 05/03/2024    HDL 81.0 05/03/2024    LDLCALC 79 05/03/2024     No results found for: \"MICROALBCREA\"  Lab Results   Component Value Date    TSH 0.94 05/03/2024     Lab Results   Component Value Date    [id number] 541 06/07/2023     Lab Results   Component Value Date    HGBA1C 5.3 09/01/2020         Physical Exam   General Appearance: " pleasant, cooperative, no acute distress  HEENT: no chemosis, no proptosis, no lid lag, no lid retraction  Neck: no lymphadenopathy, no thyromegaly, no dominant thyroid nodules  Heart: no murmurs, regular rate and rhythm, S1 and S2  Lungs: no wheezes, no rhonci, no rales  Extremities: no lower extremity swelling      Assessment/Plan   1. Hypercalcemia  -after reviewing labs/notes it would appear the hydrochlorothiazide was leading to elevated calcium and the pth was not elevated  -once coming off the hydrochlorothiazide the calcium is in the normal range    -no significant osteoporosis/fractures/kidney stones    -would simply monitor levels yearly at this point, issue seems to have resolved  -ok if pt needs to go back on hydrochlorothiazide, typically hypercalcemia with this medication elevates the calcium <1 point over the normal range    -ok to take calcium and vitamin D supplements again    2. Htn  -working with pcp/rheum currently  -lisinopril was increased and potassium is elevated (pt off diuretics as well) as well as creatinine  -suggest pt will need to decrease this medication and repeat labs, suggest she contact her pcp/rheum on this as they have been managing this  -I have sent them this note as well and instructed pt to call them today regarding this      Follow Up:  prn    -labs/tests/notes reviewed  -reviewed and counseled patient on medication monitoring and side effects

## 2025-04-24 ENCOUNTER — APPOINTMENT (OUTPATIENT)
Dept: ENDOCRINOLOGY | Facility: CLINIC | Age: 80
End: 2025-04-24
Payer: MEDICARE

## 2025-04-24 VITALS
WEIGHT: 134.8 LBS | DIASTOLIC BLOOD PRESSURE: 50 MMHG | SYSTOLIC BLOOD PRESSURE: 117 MMHG | BODY MASS INDEX: 24.8 KG/M2 | HEART RATE: 114 BPM | HEIGHT: 62 IN

## 2025-04-24 DIAGNOSIS — E83.52 HYPERCALCEMIA: ICD-10-CM

## 2025-04-24 PROCEDURE — 3078F DIAST BP <80 MM HG: CPT | Performed by: INTERNAL MEDICINE

## 2025-04-24 PROCEDURE — 1159F MED LIST DOCD IN RCRD: CPT | Performed by: INTERNAL MEDICINE

## 2025-04-24 PROCEDURE — 3074F SYST BP LT 130 MM HG: CPT | Performed by: INTERNAL MEDICINE

## 2025-04-24 PROCEDURE — 99203 OFFICE O/P NEW LOW 30 MIN: CPT | Performed by: INTERNAL MEDICINE

## 2025-04-24 PROCEDURE — 1157F ADVNC CARE PLAN IN RCRD: CPT | Performed by: INTERNAL MEDICINE

## 2025-04-24 PROCEDURE — 1036F TOBACCO NON-USER: CPT | Performed by: INTERNAL MEDICINE

## 2025-05-07 DIAGNOSIS — M05.79 RHEUMATOID ARTHRITIS INVOLVING MULTIPLE SITES WITH POSITIVE RHEUMATOID FACTOR (MULTI): ICD-10-CM

## 2025-05-08 RX ORDER — FOLIC ACID 1 MG/1
1 TABLET ORAL DAILY
Qty: 100 TABLET | Refills: 2 | Status: SHIPPED | OUTPATIENT
Start: 2025-05-08

## 2025-05-09 ENCOUNTER — OFFICE VISIT (OUTPATIENT)
Dept: PRIMARY CARE | Facility: CLINIC | Age: 80
End: 2025-05-09
Payer: MEDICARE

## 2025-05-09 VITALS
SYSTOLIC BLOOD PRESSURE: 135 MMHG | WEIGHT: 138 LBS | OXYGEN SATURATION: 99 % | BODY MASS INDEX: 25.4 KG/M2 | HEART RATE: 102 BPM | DIASTOLIC BLOOD PRESSURE: 75 MMHG | TEMPERATURE: 97.5 F | HEIGHT: 62 IN

## 2025-05-09 DIAGNOSIS — H35.3211 EXUDATIVE AGE-RELATED MACULAR DEGENERATION, RIGHT EYE, WITH ACTIVE CHOROIDAL NEOVASCULARIZATION: ICD-10-CM

## 2025-05-09 DIAGNOSIS — E03.9 HYPOTHYROIDISM (ACQUIRED): ICD-10-CM

## 2025-05-09 DIAGNOSIS — I10 BENIGN HYPERTENSION: Primary | ICD-10-CM

## 2025-05-09 DIAGNOSIS — D80.3 IGG DEFICIENCY (MULTI): ICD-10-CM

## 2025-05-09 LAB
ANION GAP SERPL CALCULATED.4IONS-SCNC: 13 MMOL/L (CALC) (ref 7–17)
BUN SERPL-MCNC: 25 MG/DL (ref 7–25)
BUN/CREAT SERPL: NORMAL (CALC) (ref 6–22)
CALCIUM SERPL-MCNC: 9.6 MG/DL (ref 8.6–10.4)
CHLORIDE SERPL-SCNC: 104 MMOL/L (ref 98–110)
CO2 SERPL-SCNC: 23 MMOL/L (ref 20–32)
CREAT SERPL-MCNC: 0.96 MG/DL (ref 0.6–1)
EGFRCR SERPLBLD CKD-EPI 2021: 60 ML/MIN/1.73M2
GLUCOSE SERPL-MCNC: 131 MG/DL (ref 65–139)
POTASSIUM SERPL-SCNC: 4.9 MMOL/L (ref 3.5–5.3)
SODIUM SERPL-SCNC: 140 MMOL/L (ref 135–146)

## 2025-05-09 PROCEDURE — 3075F SYST BP GE 130 - 139MM HG: CPT | Performed by: INTERNAL MEDICINE

## 2025-05-09 PROCEDURE — 99213 OFFICE O/P EST LOW 20 MIN: CPT | Performed by: INTERNAL MEDICINE

## 2025-05-09 PROCEDURE — 3078F DIAST BP <80 MM HG: CPT | Performed by: INTERNAL MEDICINE

## 2025-05-09 PROCEDURE — G2211 COMPLEX E/M VISIT ADD ON: HCPCS | Performed by: INTERNAL MEDICINE

## 2025-05-09 PROCEDURE — 1159F MED LIST DOCD IN RCRD: CPT | Performed by: INTERNAL MEDICINE

## 2025-05-09 PROCEDURE — 1126F AMNT PAIN NOTED NONE PRSNT: CPT | Performed by: INTERNAL MEDICINE

## 2025-05-09 PROCEDURE — 1036F TOBACCO NON-USER: CPT | Performed by: INTERNAL MEDICINE

## 2025-05-09 RX ORDER — LISINOPRIL 20 MG/1
20 TABLET ORAL DAILY
Qty: 90 TABLET | Refills: 1 | Status: SHIPPED | OUTPATIENT
Start: 2025-05-09 | End: 2025-11-05

## 2025-05-09 ASSESSMENT — PATIENT HEALTH QUESTIONNAIRE - PHQ9
1. LITTLE INTEREST OR PLEASURE IN DOING THINGS: NOT AT ALL
SUM OF ALL RESPONSES TO PHQ9 QUESTIONS 1 AND 2: 0
2. FEELING DOWN, DEPRESSED OR HOPELESS: NOT AT ALL

## 2025-05-09 ASSESSMENT — PAIN SCALES - GENERAL: PAINLEVEL_OUTOF10: 0-NO PAIN

## 2025-05-09 ASSESSMENT — ENCOUNTER SYMPTOMS
BLOOD IN STOOL: 0
NUMBNESS: 0
FREQUENCY: 0
WEAKNESS: 0
CHILLS: 0
DYSURIA: 0
FATIGUE: 0
SINUS PRESSURE: 0
DEPRESSION: 0
WHEEZING: 0
MYALGIAS: 0
OCCASIONAL FEELINGS OF UNSTEADINESS: 0
UNEXPECTED WEIGHT CHANGE: 0
SEIZURES: 0
ABDOMINAL PAIN: 0
PALPITATIONS: 0
LOSS OF SENSATION IN FEET: 0
NAUSEA: 0
BACK PAIN: 1
TROUBLE SWALLOWING: 0
VOMITING: 0
SHORTNESS OF BREATH: 0
COUGH: 0
ARTHRALGIAS: 1
TREMORS: 0

## 2025-05-09 NOTE — PROGRESS NOTES
"Subjective   Patient ID: Pat Delarosa is a 79 y.o. female who presents for 2 month f/u (Dr states that she needs lisinopril lowered).    HPI   Has history of hypertension, rheumatoid arthritis, hypothyroidism        History of hypertension - compliant with medications, denied any chest pain, pedal edema    Seeing rheumatology, diagnosed as PMR  On long taper of prednisone  Stated joint aches much better, feeling back to herself    Refused mammogram this year    Was referred to endocrinology for hypercalcemia by rheum, hypercalcemia likely due to hydrochlorothiazide, symptoms better since hydrochlorothiazide stopped     CKD- stopped hydrochlorothiazide, increased lisinopril, if continues to worsen will give referral to nephro  Going for EMG testing for bilateral lower extremity weakness    Calcium level better since we stopped hydrochlorothiazide, GFR and potassium level worse after lisinopril was increased from 20 to 30 mg     Review of Systems   Constitutional:  Negative for chills, fatigue and unexpected weight change.   HENT:  Negative for postnasal drip, sinus pressure and trouble swallowing.    Respiratory:  Negative for cough, shortness of breath and wheezing.    Cardiovascular:  Negative for chest pain, palpitations and leg swelling.   Gastrointestinal:  Negative for abdominal pain, blood in stool, nausea and vomiting.   Genitourinary:  Negative for dysuria and frequency.   Musculoskeletal:  Positive for arthralgias and back pain. Negative for myalgias.   Skin:  Negative for rash.   Neurological:  Negative for tremors, seizures, weakness and numbness.   Psychiatric/Behavioral:  Negative for behavioral problems and suicidal ideas.        Objective   /75 (BP Location: Left arm, Patient Position: Sitting, BP Cuff Size: Small adult)   Pulse 102   Temp 36.4 °C (97.5 °F) (Temporal)   Ht 1.575 m (5' 2\")   Wt 62.6 kg (138 lb)   LMP  (LMP Unknown)   SpO2 99%   BMI 25.24 kg/m²     Physical " Exam  Constitutional:       General: She is not in acute distress.  HENT:      Head: Normocephalic and atraumatic.   Eyes:      Extraocular Movements: Extraocular movements intact.      Conjunctiva/sclera: Conjunctivae normal.   Cardiovascular:      Rate and Rhythm: Normal rate and regular rhythm.      Pulses: Normal pulses.      Heart sounds: No murmur heard.  Pulmonary:      Effort: Pulmonary effort is normal.      Breath sounds: Normal breath sounds. No wheezing or rales.   Abdominal:      General: Bowel sounds are normal.      Palpations: Abdomen is soft. There is no mass.      Tenderness: There is no abdominal tenderness. There is no guarding.   Musculoskeletal:      Comments: Ankle edema bilateral   Neurological:      General: No focal deficit present.      Mental Status: She is alert and oriented to person, place, and time.      Cranial Nerves: No cranial nerve deficit.      Gait: Gait normal.   Psychiatric:         Mood and Affect: Mood normal.         Assessment/Plan        Pat was seen today for 2 month f/u.  Diagnoses and all orders for this visit:  Benign hypertension (Primary)  -     lisinopril 20 mg tablet; Take 1 tablet (20 mg) by mouth once daily.  -     Basic Metabolic Panel; Future  -     Basic Metabolic Panel  Hypothyroidism (acquired)  Exudative age-related macular degeneration, right eye, with active choroidal neovascularization  IgG deficiency (Multi)        Reviewed recent labs, and rheumatology notes    Reduce lisinopril to 20 mg from 30 mg  Check blood pressure at home, our goal blood pressure reading is less than 140/80    Lab Results   Component Value Date    WBC 12.5 (H) 03/03/2025    HGB 13.0 03/03/2025    HCT 38.5 03/03/2025    MCV 98.7 03/03/2025     03/03/2025     Lab Results   Component Value Date    GLUCOSE 150 (H) 03/03/2025    CALCIUM 10.0 03/03/2025     03/03/2025    K 5.8 (H) 03/03/2025    CO2 23 03/03/2025     03/03/2025    BUN 36 (H) 03/03/2025     "CREATININE 1.43 (H) 03/03/2025     Lab Results   Component Value Date    TSH 0.94 05/03/2024       Lab Results   Component Value Date    WBC 12.5 (H) 03/03/2025    HGB 13.0 03/03/2025    HCT 38.5 03/03/2025    MCV 98.7 03/03/2025     03/03/2025     Lab Results   Component Value Date    GLUCOSE 150 (H) 03/03/2025    CALCIUM 10.0 03/03/2025     03/03/2025    K 5.8 (H) 03/03/2025    CO2 23 03/03/2025     03/03/2025    BUN 36 (H) 03/03/2025    CREATININE 1.43 (H) 03/03/2025     Lab Results   Component Value Date    CHOL 171 05/03/2024    CHOL 195 06/07/2023    CHOL 176 08/29/2022     Lab Results   Component Value Date    HDL 81.0 05/03/2024     06/07/2023    HDL 95 08/29/2022     Lab Results   Component Value Date    LDLCALC 79 05/03/2024    LDLCALC 66 06/07/2023    LDLCALC 71 08/29/2022     Lab Results   Component Value Date    TRIG 53 05/03/2024    TRIG 75 06/07/2023    TRIG 51 08/29/2022     No components found for: \"CHOLHDL\"      XR HIP BILATERAL 5V PEL/AP/LAT EACH HIP  Order: [id number]  Impression    IMPRESSION:    No radiographic findings of inflammatory arthropathy    Osteoarthritis of the hips bilaterally    Mild degenerative arthritis of the sacroiliac joints    Moderate lumbar spondylosis most prominently at the L2-L3 level.          : REED    Transcribe Date/Time: Sep  8 2024  4:00P    Dictated by : DAMION STEWART MD    This examination was interpreted and the report reviewed and  electronically signed by:  DAMION STEWART MD on Sep  8 2024  4:03PM  EST  Narrative    * * *Final Report* * *    DATE OF EXAM: Sep  7 2024  8:49AM      [facility]   5353  -  XR HIP DIANE 5V PEL+ AP/LAT EA HIP  / ACCESSION #  [id number]    PROCEDURE REASON: Polymyalgia rheumatica (HCC)        * * * * Physician Interpretation * * * *     REASON FOR STUDY:  Polymyalgia rheumatica (HCC)   .    PATIENT/TECHNOLOGIST-PROVIDED HISTORY:  RHEUMATOID ARTHRITIS      TECHNIQUE: XR LUMBAR 3V AP/LAT/L5-S1, XR SI JTS 2V " AP PELV/ANTHONY, XR  HIP DIANE 5V PEL+ AP/LAT EA HIP     Laterality:  NOT APPLICABLE (accession [id number]), NOT APPLICABLE  (accession [id number]), BILATERAL (accession [id number])     Number of different views (projections): 3 (accession [id number]), 2  (accession [id number])    COMPARISON: None    RESULT:    Lumbar spine:    Counting reference:  Lumbosacral junction.  For the purposes of this  report,  L4-5 is considered the level of the iliac crest and assume there  are 5 lumbar-type vertebrae.  Anatomic variant:  None.    The vertebral body heights are preserved.  Dextroscoliosis centered at   L2-L3 is present.  Multilevel degenerative disc disease with endplate  marginal osteophytes and intervertebral disc space narrowing most  prominently at L2-L3 which is severe with vacuum disc phenomenon in the  anterior aspect of the intervertebral disc space, mild otherwise in the  remaining levels.  Mild retrolisthesis of L2 on L3 and L3 on L4 is  present.  Moderate facet arthropathy in the lower lumbar spine is  present, most prominently at L4-5 and L5-S1. Atherosclerotic  calcification is noted in the aorta.    Sacroiliac joints:    Symmetric mild degenerative changes in the bilateral sacroiliac joints  are noted.  No abnormal widening, cortical indistinctness, or erosive  changes is seen to suggest sacroiliitis.    Hips:    Symmetric axial joint space narrowing, mild, with associated mild  marginal osteophyte formation is seen bilaterally.  No juxta-articular  bony erosive changes are noted. The symphysis pubis demonstrates mild  degenerative changes.  There is chondrocalcinosis at the symphysis pubis.   There is no acute fracture or dislocation.  Incidental note is made of  vascular calcifications in the femoral arteries bilaterally.    No other significant abnormality is seen.  ---------------------------------------------  Exam End: 09/07/24 08:49    Specimen Collected: 09/07/24 08:49 Last Resulted: 09/08/24 16:05    Received From: [facility]  Result Received: 10/08/24 02:53      Current Outpatient Medications   Medication Instructions    amLODIPine (NORVASC) 10 mg, oral, Daily    folic acid (FOLVITE) 1 mg, oral, Daily    furosemide (LASIX) 10 mg, oral, Daily PRN    levothyroxine (Synthroid, Levoxyl) 50 mcg tablet TAKE 1 TABLET BY MOUTH ON AN  EMPTY STOMACH ONCE DAILY IN THE  MORNING    lisinopril 20 mg, oral, Daily    methotrexate (TREXALL) 10 mg, oral, Weekly, Follow directions carefully, and ask to explain any part you do not understand. Take exactly as directed.    predniSONE (Deltasone) 1 mg tablet Take 6 tablets (6 mg) by mouth once daily for 30 days, THEN 5 tablets (5 mg) once daily. Do not fill before April 7, 2025.

## 2025-05-10 LAB
CHOLEST SERPL-MCNC: 228 MG/DL
CHOLEST/HDLC SERPL: 1.9 (CALC)
EST. AVERAGE GLUCOSE BLD GHB EST-MCNC: 111 MG/DL
EST. AVERAGE GLUCOSE BLD GHB EST-SCNC: 6.2 MMOL/L
HBA1C MFR BLD: 5.5 %
HDLC SERPL-MCNC: 121 MG/DL
LDLC SERPL CALC-MCNC: 91 MG/DL (CALC)
NONHDLC SERPL-MCNC: 107 MG/DL (CALC)
TRIGL SERPL-MCNC: 69 MG/DL
TSH SERPL-ACNC: 1.35 MIU/L (ref 0.4–4.5)

## 2025-05-14 DIAGNOSIS — R60.0 PEDAL EDEMA: ICD-10-CM

## 2025-05-14 RX ORDER — FUROSEMIDE 20 MG/1
TABLET ORAL
Qty: 50 TABLET | Refills: 2 | Status: SHIPPED | OUTPATIENT
Start: 2025-05-14

## 2025-05-23 DIAGNOSIS — M35.3 PMR (POLYMYALGIA RHEUMATICA) (MULTI): ICD-10-CM

## 2025-05-23 DIAGNOSIS — R76.8 RHEUMATOID FACTOR POSITIVE: ICD-10-CM

## 2025-06-09 ENCOUNTER — OFFICE VISIT (OUTPATIENT)
Facility: CLINIC | Age: 80
End: 2025-06-09
Payer: MEDICARE

## 2025-06-09 VITALS
WEIGHT: 140.6 LBS | SYSTOLIC BLOOD PRESSURE: 141 MMHG | HEART RATE: 98 BPM | BODY MASS INDEX: 25.72 KG/M2 | OXYGEN SATURATION: 99 % | DIASTOLIC BLOOD PRESSURE: 69 MMHG

## 2025-06-09 DIAGNOSIS — R29.898 WEAKNESS OF BOTH LOWER EXTREMITIES: ICD-10-CM

## 2025-06-09 DIAGNOSIS — M11.20 CHONDROCALCINOSIS: ICD-10-CM

## 2025-06-09 DIAGNOSIS — M54.10 POLYRADICULOPATHY: ICD-10-CM

## 2025-06-09 DIAGNOSIS — R76.8 RHEUMATOID FACTOR POSITIVE: ICD-10-CM

## 2025-06-09 DIAGNOSIS — M35.3 PMR (POLYMYALGIA RHEUMATICA) (MULTI): Primary | ICD-10-CM

## 2025-06-09 DIAGNOSIS — Z79.899 HIGH RISK MEDICATION USE: ICD-10-CM

## 2025-06-09 PROCEDURE — 1036F TOBACCO NON-USER: CPT | Performed by: INTERNAL MEDICINE

## 2025-06-09 PROCEDURE — 1159F MED LIST DOCD IN RCRD: CPT | Performed by: INTERNAL MEDICINE

## 2025-06-09 PROCEDURE — 3077F SYST BP >= 140 MM HG: CPT | Performed by: INTERNAL MEDICINE

## 2025-06-09 PROCEDURE — 99215 OFFICE O/P EST HI 40 MIN: CPT | Performed by: INTERNAL MEDICINE

## 2025-06-09 PROCEDURE — 1126F AMNT PAIN NOTED NONE PRSNT: CPT | Performed by: INTERNAL MEDICINE

## 2025-06-09 PROCEDURE — 3078F DIAST BP <80 MM HG: CPT | Performed by: INTERNAL MEDICINE

## 2025-06-09 RX ORDER — PREDNISONE 1 MG/1
TABLET ORAL
Qty: 210 TABLET | Refills: 0 | Status: SHIPPED | OUTPATIENT
Start: 2025-06-09 | End: 2025-08-08

## 2025-06-09 ASSESSMENT — ENCOUNTER SYMPTOMS
OCCASIONAL FEELINGS OF UNSTEADINESS: 0
DEPRESSION: 0
LOSS OF SENSATION IN FEET: 0

## 2025-06-09 ASSESSMENT — PAIN SCALES - GENERAL: PAINLEVEL_OUTOF10: 0-NO PAIN

## 2025-06-09 NOTE — PROGRESS NOTES
"Subjective   Patient ID: [id number]  Pat Delarosa is a 80 y.o. female who presents for follow up    HPI  HPI  PMH/PSH: HTN, HyperPTH s/p parathyroidectomy, hypothyroidism, lumbar DDD, R sided macular degeneration, CKD?  Social: Nonsmoker, no alcohol, no drug use. Retired, was a , retired 10 years ago. Had 4 children but lost 2   FHx: No family history of autoimmune diseases     Recall from  and [facility] records 09/2024:  She was seeing Dr. Hansen x11 years for seropositive RA and was on HCQ.      Patient cannot recall how her symptoms started before diagnosis but thinks her arms and legs were sore and was then diagnosed with inflammatory arthritis and PMR 12/2019 by Dr. Boggs and started on prednisone.      She was treated with methotrexate and Enbrel initially which were ineffective and then was switched to Actemra. She is unsure how the effective the Actemra was \"I was still having weakness for my legs, they were working but I also think they weren't\" Her Actemra infusions were later switched to [facility] infusion center which was too far so she stopped taking the infusions.   She reports she was doing well off Actemra until August 2024 (all of a sudden she had severe pain in neck, shoulders and hip area associated with stiffness), getting out of bed was extremely a chore. She lost 15 lbs (140 to 125 lb) Went to her primary care doctor and they prescribed her prednisone 8/15/2024. 60 mg for 2 days, 50 mg for 2 days, 10 mg every 2 days. Her last dose was 8/27/2024. That significantly helped her symptoms. She reported recurrence of symptoms within 2 days of stopping the prednisone. She was then seen at [facility] rheumatology and restarted on prednisone 20mg for 6 weeks and her symptoms improved but she reported b/l thigh weakness and stiffness recurred when the prednisone was off (per notes she was supposed to taper it off, unclear if she did the taper, is forgetful) so it was increased again to " 20mg.     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 This makes a   diagnosis of hereditary hemochromatosis very unlikely.      09/06/2024 at [facility]:  ESR and CRP normal   CCP 41, RF neg, mildy elevated free kappa w/o m protein     Current meds:  Prednisone 5mg (tapered from 20mg)  Methotrexate 10mg/week + FA (1/6/2025)  Fosamax weekly since 05/2021  Vitamin D 2000 international units daily      Prior meds:  Methotrexate  HCQ  Enbrel   Actemra      1/6/2025, she reported again significant prednisone response and was placed on a prednisone taper.   She reports ongoing improvement with b/l hip pain, buttock pain, shoulder and arm pain and stiffness (100%) while on the prednisone taper. She is back to full functionality.     Feels 100% better on current regimen. Didn't do PT but reports resolution of her back pain. No hand or wrist pain or swelling. She feels her legs are still weak.     She was tapering down prednisone by 1mgqmonth and has 6 more days of the prednisone 5mg.      No fever, chills, weight loss, night sweats or headaches. No dry eyes, blurry vision, redness or pain or photophobia. No dry mouth, dental loss, loss of taste, nasal or oral ulcers, difficulty swallowing. No chest pain, palpitations, orthopnea. No shortness of breath, cough. No dysphagia, nausea, vomiting, heartburn, abdominal pain, constipation, diarrhea, melena or hematochezia  No genital or anal ulcers. No photosensitivity, rash or lesions, Raynaud's phenomenon. No numbness or tingling.     Patient Active Problem List   Diagnosis    Abnormal laboratory test result    Depression    Dyslipidemia    Benign hypertension    Gastroesophageal  reflux disease    Hypercalcemia    Hyperglycemia    Hypothyroidism (acquired)    IgG deficiency (Multi)    Macrocytosis    Menopausal symptom    Osteoporosis    Rheumatoid arthritis involving multiple sites with positive rheumatoid factor (Multi)    Vitamin D deficiency    Age-related osteoporosis without current pathological fracture    Oropharyngeal dysphagia    Stage 3a chronic kidney disease (Multi)    Exudative age-related macular degeneration, right eye, with active choroidal neovascularization    Peripheral vascular disease, unspecified    Postprocedural hypoparathyroidism (Multi)       Past Medical History:   Diagnosis Date    CKD (chronic kidney disease) stage 2, GFR 60-89 ml/min 09/03/2023       History reviewed. No pertinent surgical history.    Social History     Socioeconomic History    Marital status:      Spouse name: Not on file    Number of children: Not on file    Years of education: Not on file    Highest education level: Not on file   Occupational History    Not on file   Tobacco Use    Smoking status: Former     Types: Cigarettes    Smokeless tobacco: Never   Vaping Use    Vaping status: Never Used   Substance and Sexual Activity    Alcohol use: Not Currently    Drug use: Never    Sexual activity: Defer   Other Topics Concern    Not on file   Social History Narrative    Not on file     Social Drivers of Health     Financial Resource Strain: Not on file   Food Insecurity: Not on file   Transportation Needs: Not on file   Physical Activity: Not on file   Stress: Not on file   Social Connections: Not on file   Intimate Partner Violence: Not on file   Housing Stability: Not on file       No Known Allergies      Current Outpatient Medications:     amLODIPine (Norvasc) 10 mg tablet, TAKE 1 TABLET BY MOUTH ONCE  DAILY, Disp: 100 tablet, Rfl: 1    folic acid (Folvite) 1 mg tablet, TAKE 1 TABLET BY MOUTH ONCE  DAILY, Disp: 100 tablet, Rfl: 2    furosemide (Lasix) 20 mg tablet, TAKE ONE-HALF TABLET  BY MOUTH  ONCE DAILY AS NEEDED FOR PEDAL  EDEMA, Disp: 50 tablet, Rfl: 2    levothyroxine (Synthroid, Levoxyl) 50 mcg tablet, TAKE 1 TABLET BY MOUTH ON AN  EMPTY STOMACH ONCE DAILY IN THE  MORNING, Disp: 100 tablet, Rfl: 1    lisinopril 20 mg tablet, Take 1 tablet (20 mg) by mouth once daily., Disp: 90 tablet, Rfl: 1    methotrexate (Trexall) 2.5 mg tablet, Take 4 tablets (10 mg total) by mouth 1 (one) time per week.  Follow directions carefully, and ask to explain any part you do not understand. Take exactly as directed., Disp: 68 tablet, Rfl: 0    Objective     Visit Vitals  /69 (BP Location: Left arm, Patient Position: Sitting)   Pulse 98     Physical Exam  Copied from previous exam  General: AAOx3, Cooperative  Head: normocephalic, atraumatic  Eyes: EOMI, conjunctiva clear, sclera white, anicteric  Throat/Mouth: No oral deformities, no cheek swelling, mucosa appear dry, no oral ulcers noted or loss of dentition   L hip proximal weakness 3+/5 on the L, 4/5 on the R, rest of muscle strength is 5/5 proximal and distal.  Skin: No rashes, ulcers or photosensitive areas  MSK: Upper Extremities:  Hand/Fingers: No erythema, swelling, tenderness or warmth at DIP, PIP, or MCP joints, FROM grossly. Good hand . No nodules. No deformities + OA changes  Wrists: No erythema, swelling, warmth or tenderness at wrist, FROM grossly  Elbows: No tenderness, swelling, erythema or warmth at elbows, FROM grossly. No nodules   Shoulders: No swelling, erythema, tenderness or warmth at shoulders. FROM  Lower Extremities:   Hips: No obvious deformities. No joint tenderness, normal ROM grossly. Log roll test negative bilaterally. Sourav test is negative bilaterally but with mild limitation. No trochanteric bursae TTP  Knees: No tenderness, deformities, swelling, rashes, or warmth, normal ROM grossly. No crepitus, no pes anserine bursa TTP   Ankles: No deformities, tenderness, edema, erythema, ulceration, or warmth at the  ankle  Feet: Negative MTP squeeze. Normal ROM grossly.       Lab Results   Component Value Date    WBC 12.5 (H) 03/03/2025    HGB 13.0 03/03/2025    HCT 38.5 03/03/2025    MCV 98.7 03/03/2025     03/03/2025       Chemistry    Lab Results   Component Value Date/Time     05/09/2025 1026    K 4.9 05/09/2025 1026     05/09/2025 1026    CO2 23 05/09/2025 1026    BUN 25 05/09/2025 1026    CREATININE 0.96 05/09/2025 1026    Lab Results   Component Value Date/Time    CALCIUM 9.6 05/09/2025 1026    ALKPHOS 51 03/03/2025 1442    AST 33 03/03/2025 1442    ALT 30 (H) 03/03/2025 1442    BILITOT 0.7 03/03/2025 1442          Lab Results   Component Value Date    CRP <3.0 03/03/2025    SPEP Normal. 01/06/2025    PTH 68 (H) 07/07/2023    CALCIUM 9.6 05/09/2025    CAION 1.25 04/09/2020    PHOS 3.5 01/03/2020     ALKALINE PHOSPHATASE   Date Value Ref Range Status   03/03/2025 51 37 - 153 U/L Final     AST   Date Value Ref Range Status   03/03/2025 33 10 - 35 U/L Final     UREA NITROGEN (BUN)   Date Value Ref Range Status   05/09/2025 25 7 - 25 mg/dL Final     SODIUM   Date Value Ref Range Status   05/09/2025 140 135 - 146 mmol/L Final     POTASSIUM   Date Value Ref Range Status   05/09/2025 4.9 3.5 - 5.3 mmol/L Final     CARBON DIOXIDE   Date Value Ref Range Status   05/09/2025 23 20 - 32 mmol/L Final     ALT   Date Value Ref Range Status   03/03/2025 30 (H) 6 - 29 U/L Final     Vitamin D, 25-Hydroxy, Total   Date Value Ref Range Status   05/03/2024 52 31 - 100 ng/mL Final       EMG & nerve conduction  Impression:    This is an abnormal study of the left upper and lower extremities with the following findings:    There is evidence most consistent with a chronic left lumbosacral polyradiculopathy affecting the L5 and S1 levels greater than L3/L4 which are mildly affected.  However, due to the low amplitude sensory responses, a lumbosacral plexopathy cannot be   fully excluded.    There is also evidence of a  superimposed generalized, chronic, axonal sensorimotor peripheral polyneuropathy.    In addition, there is evidence consistent with left chronic C7 radiculopathy, and milder chronic C8 radiculopathy, both without active denervation.    There is no evidence of myopathy in the left upper or lower extremity.    Jose Baez MD    --------------------------------------------------------------------------------------------------------------------------------------------------------------------------------------------------------------------------------------------------------------  --------------------------------------------------------------------------------------------------------------------------------------------------------------------------------------------------------------------------------------------------------------  --------------------------------------------------------------------------------------------------------------------------------------------------------------------------------------------------------------------------------    XR hips, lumbar spine at [facility] 09/2024  Lumbar spine:   Counting reference:  Lumbosacral junction.  For the purposes of this   report,  L4-5 is considered the level of the iliac crest and assume there   are 5 lumbar-type vertebrae.  Anatomic variant:  None.     The vertebral body heights are preserved.  Dextroscoliosis centered at   L2-L3 is present.  Multilevel degenerative disc disease with endplate   marginal osteophytes and intervertebral disc space narrowing most   prominently at L2-L3 which is severe with vacuum disc phenomenon in the   anterior aspect of the intervertebral disc space, mild otherwise in the   remaining levels.  Mild retrolisthesis of L2 on L3 and L3 on L4 is   present.  Moderate facet arthropathy in the lower lumbar spine is   present, most prominently at L4-5 and L5-S1. Atherosclerotic   calcification is noted in the aorta.     Sacroiliac  joints:   Symmetric mild degenerative changes in the bilateral sacroiliac joints   are noted.  No abnormal widening, cortical indistinctness, or erosive   changes is seen to suggest sacroiliitis.     Hips:   Symmetric axial joint space narrowing, mild, with associated mild   marginal osteophyte formation is seen bilaterally.  No juxta-articular   bony erosive changes are noted. The symphysis pubis demonstrates mild   degenerative changes.  There is chondrocalcinosis at the symphysis pubis.    There is no acute fracture or dislocation.  Incidental note is made of   vascular calcifications in the femoral arteries bilaterally.      Hand XR 09/2024 at [facility]  RESULT:     Note is made of proximal interphalangeal and distal interphalangeal joint   degenerative changes which are overall mild.   Moderate second   metacarpophalangeal and third metacarpophalangeal joint space narrowing   on the RIGHT side is present.  Joint capsule calcification is seen in the   LEFT second and RIGHT second and third metacarpophalangeal joint   capsules.  Triangular fibrocartilage chondrocalcinosis is present   bilaterally.  No juxta-articular bony erosive changes are noted.      DEXA 01/2024  FINDINGS:  SPINE L1-L4  Bone Mineral Density: 1.392 g/cm2  T-Score 3.1  Z-Score 5.7  Bone Mineral Density change vs baseline:  16.9 %  Bone Mineral Density change vs previous: 9.9 %      LEFT FEMUR -TOTAL  Bone Mineral Density: 0.836 g/cm2  T-Score -0.9   Z-Score  1.1  Bone Mineral Density change vs baseline: -9.8 %  Bone Mineral Density change vs previous: -7.5 %      LEFT FEMUR -NECK  Bone Mineral Density: 0.673 g/cm2  T-Score -1.6  Z-Score 0.7     DEXA 04/2021  IMPRESSION:     BMD of lumbar spine is in the normal range with T-score of 2.0.  Significant interval increase in bone mineral density since the baseline by  6.3%. No significant change since the previous.  BMD of the LEFT total hip is in the normal range with T score of -0.3.   No  significant  changes in bone marrow density.  BMD of the femoral neck is in osteopenic range with T-score of -1.6 .  BMD of the left forearm is in the normal range with T score of 0.2.  BMD of the left forearm UD is in the normal range with T score of -0.1.     XR ankle right 2016  IMPRESSION:     BMD of lumbar spine is in the normal range with T-score of 2.0.  Significant interval increase in bone mineral density since the baseline by  6.3%. No significant change since the previous.  BMD of the LEFT total hip is in the normal range with T score of -0.3.   No  significant changes in bone marrow density.  BMD of the femoral neck is in osteopenic range with T-score of -1.6 .  BMD of the left forearm is in the normal range with T score of 0.2.  BMD of the left forearm UD is in the normal range with T score of -0.1.    Assessment/Plan   A 79 year old F here for follow up.      She has a previous diagnosis of RA and PMR. She has some difficulty providing a detailed medical history but Dr. Boggs's office notes, labs from [facility] were reviewed.      Patient reports a classic PMR picture with pelvic and shoulder girdle pain/stiffness and geling phenomenon that significantly resolved with prednisone with recurrence in her symptoms once prednisone was discontinued.   Today she reports 100% resolution in pelvic and shoulder girdle symptoms while on 20mg of prednisone even with tapering of prednisone.      She also has a positive ACPA from [facility] records, but reports no episodes of pain or swelling in the peripheral joints. Physical exam without evidence of peripheral synovitis but patient is on 10mg of prednisone so this will need to be monitored closely while further tapering down prednisone.   Testing done at  with low titer RF, negative ACPA  On review of prior imaging performed at [facility], she has chondrocalcinosis and moderate narrowing of 2nd and 3rd MCP, chondrocalcinosis at symphysis pubis and hip osteoarthritis.   PTH is normal,  magnesium and phosphorus are normal. She is heterozygous carrier of the H63D variant, making a diagnosis of hemochromatosis unlikley.     MR hip 11/25/2024 with b/l moderate OA, an asymmetric small left hip joint effusion.  mild SI OA, mild pubic symphysis OA,  Mild left-sided gluteus minimus tendinosis without tear. There also  bilateral hamstring origin tendinosis, mild, with low-grade tearing.     At initial visit, she reported left proximal LE weakness >R, has chronic low back pain,   MR lumbar spine 1/27/2025 with lumbar spondylosis   CPK and aldolase are normal. EMG of LLE pending.     EMG 4/3/2025 reviewed:   chronic left lumbosacral polyradiculopathy affecting the L5 and S1 levels greater than L3/L4 which are mildly affected.  However, due to the low amplitude sensory responses, a lumbosacral plexopathy cannot be   fully excluded. There is also evidence of a superimposed generalized, chronic, axonal sensorimotor peripheral polyneuropathy. there is evidence consistent with left chronic C7 radiculopathy, and milder chronic C8 radiculopathy, both without active denervation.  There is no evidence of myopathy in the left upper or lower extremity    Impression: PMR/seropositive RA, doing well on prednisone taper   Also concern for inflammatory arthritis with low titer RF, positive ACPA at [facility] previously, chondrocalcinosis   Flexor stenosing tenosynovitis of L hand    Reports 100% in symptomatology with methotrexate and prednisone taper    Labs 3/3/2025 reviewed:  Cr stable, ALT upper limit of normal, ESR and CRP normal     01/2025:   SPEP has normalized, previously mildy elevated free kappa w/o m protein    - Labs today  - Continue to taper prednisone by 1mg qmonth   - Continue methotrexate 10mg/week + FA. Will refill after labs.   - Counseled on flexor stenosing tenosynovitis, voltaren gel and splinting   - Refer to PT for lumbar spondylosis and strengthening, asked her to call and schedule  - Refer to neurology  for peripheral polyneuropathy     Osteopenia with high FRAX  - continue fosamax and vitamin D   - Needs DEXA 01/2026, she will be on fosamax for 5 years, will discuss drug holiday at that time vs continuation of BP    Referred to endocrinology for hypercalcemia who believe calcium is elevated due to hydrochlorothiazide     RTC in 2 months     Sang More MD  Division of Rheumatology   [facility]

## 2025-06-10 LAB
ALBUMIN SERPL-MCNC: 5.1 G/DL (ref 3.6–5.1)
ALP SERPL-CCNC: 71 U/L (ref 37–153)
ALT SERPL-CCNC: 20 U/L (ref 6–29)
ANION GAP SERPL CALCULATED.4IONS-SCNC: 17 MMOL/L (CALC) (ref 7–17)
AST SERPL-CCNC: 30 U/L (ref 10–35)
BASOPHILS # BLD AUTO: 53 CELLS/UL (ref 0–200)
BASOPHILS NFR BLD AUTO: 0.5 %
BILIRUB SERPL-MCNC: 0.6 MG/DL (ref 0.2–1.2)
BUN SERPL-MCNC: 22 MG/DL (ref 7–25)
CALCIUM SERPL-MCNC: 9.8 MG/DL (ref 8.6–10.4)
CHLORIDE SERPL-SCNC: 102 MMOL/L (ref 98–110)
CO2 SERPL-SCNC: 23 MMOL/L (ref 20–32)
CREAT SERPL-MCNC: 1.03 MG/DL (ref 0.6–0.95)
CRP SERPL-MCNC: 6.9 MG/L
EGFRCR SERPLBLD CKD-EPI 2021: 55 ML/MIN/1.73M2
EOSINOPHIL # BLD AUTO: 11 CELLS/UL (ref 15–500)
EOSINOPHIL NFR BLD AUTO: 0.1 %
ERYTHROCYTE [DISTWIDTH] IN BLOOD BY AUTOMATED COUNT: 13.4 % (ref 11–15)
ERYTHROCYTE [SEDIMENTATION RATE] IN BLOOD BY WESTERGREN METHOD: 28 MM/H
GLUCOSE SERPL-MCNC: 116 MG/DL (ref 65–99)
HCT VFR BLD AUTO: 44 % (ref 35–45)
HGB BLD-MCNC: 14.6 G/DL (ref 11.7–15.5)
LYMPHOCYTES # BLD AUTO: 2512 CELLS/UL (ref 850–3900)
LYMPHOCYTES NFR BLD AUTO: 23.7 %
MCH RBC QN AUTO: 34 PG (ref 27–33)
MCHC RBC AUTO-ENTMCNC: 33.2 G/DL (ref 32–36)
MCV RBC AUTO: 102.3 FL (ref 80–100)
MONOCYTES # BLD AUTO: 445 CELLS/UL (ref 200–950)
MONOCYTES NFR BLD AUTO: 4.2 %
NEUTROPHILS # BLD AUTO: 7579 CELLS/UL (ref 1500–7800)
NEUTROPHILS NFR BLD AUTO: 71.5 %
PLATELET # BLD AUTO: 370 THOUSAND/UL (ref 140–400)
PMV BLD REES-ECKER: 9.7 FL (ref 7.5–12.5)
POTASSIUM SERPL-SCNC: 5 MMOL/L (ref 3.5–5.3)
PROT SERPL-MCNC: 7.9 G/DL (ref 6.1–8.1)
RBC # BLD AUTO: 4.3 MILLION/UL (ref 3.8–5.1)
SODIUM SERPL-SCNC: 142 MMOL/L (ref 135–146)
WBC # BLD AUTO: 10.6 THOUSAND/UL (ref 3.8–10.8)

## 2025-07-14 ENCOUNTER — TELEPHONE (OUTPATIENT)
Facility: CLINIC | Age: 80
End: 2025-07-14
Payer: MEDICARE

## 2025-07-14 NOTE — TELEPHONE ENCOUNTER
"PT LEFT , STATES WHEN SHE WAS SEEN \"A COUPLE WEEKS\" AGO YOU WERE GOING TO ORDER methotrexate. SHE IS NOW OUT OF MEDS & ASKING FOR REFILL.  "

## 2025-07-15 DIAGNOSIS — M05.79 RHEUMATOID ARTHRITIS INVOLVING MULTIPLE SITES WITH POSITIVE RHEUMATOID FACTOR (MULTI): ICD-10-CM

## 2025-07-15 RX ORDER — METHOTREXATE 2.5 MG/1
10 TABLET ORAL WEEKLY
Qty: 68 TABLET | Refills: 0 | Status: SHIPPED | OUTPATIENT
Start: 2025-07-15 | End: 2025-11-12

## 2025-07-15 RX ORDER — FOLIC ACID 1 MG/1
1 TABLET ORAL DAILY
Qty: 90 TABLET | Refills: 1 | Status: SHIPPED | OUTPATIENT
Start: 2025-07-15 | End: 2026-01-11

## 2025-07-16 DIAGNOSIS — E03.9 HYPOTHYROIDISM (ACQUIRED): ICD-10-CM

## 2025-07-17 DIAGNOSIS — I10 BENIGN HYPERTENSION: ICD-10-CM

## 2025-07-17 RX ORDER — LISINOPRIL 20 MG/1
20 TABLET ORAL DAILY
Qty: 100 TABLET | Refills: 1 | Status: SHIPPED | OUTPATIENT
Start: 2025-07-17

## 2025-07-17 RX ORDER — LEVOTHYROXINE SODIUM 50 UG/1
TABLET ORAL
Qty: 100 TABLET | Refills: 1 | Status: SHIPPED | OUTPATIENT
Start: 2025-07-17

## 2025-08-15 DIAGNOSIS — M35.3 PMR (POLYMYALGIA RHEUMATICA) (MULTI): ICD-10-CM

## 2025-08-15 DIAGNOSIS — R76.8 RHEUMATOID FACTOR POSITIVE: ICD-10-CM

## 2025-08-18 ENCOUNTER — OFFICE VISIT (OUTPATIENT)
Facility: CLINIC | Age: 80
End: 2025-08-18
Payer: MEDICARE

## 2025-08-18 VITALS
WEIGHT: 135.6 LBS | HEIGHT: 62 IN | SYSTOLIC BLOOD PRESSURE: 147 MMHG | HEART RATE: 79 BPM | DIASTOLIC BLOOD PRESSURE: 54 MMHG | BODY MASS INDEX: 24.95 KG/M2

## 2025-08-18 DIAGNOSIS — M81.0 OSTEOPOROSIS, UNSPECIFIED OSTEOPOROSIS TYPE, UNSPECIFIED PATHOLOGICAL FRACTURE PRESENCE: ICD-10-CM

## 2025-08-18 DIAGNOSIS — Z79.899 HIGH RISK MEDICATION USE: ICD-10-CM

## 2025-08-18 DIAGNOSIS — R76.8 RHEUMATOID FACTOR POSITIVE: ICD-10-CM

## 2025-08-18 DIAGNOSIS — M35.3 PMR (POLYMYALGIA RHEUMATICA) (MULTI): ICD-10-CM

## 2025-08-18 DIAGNOSIS — M05.79 RHEUMATOID ARTHRITIS INVOLVING MULTIPLE SITES WITH POSITIVE RHEUMATOID FACTOR (MULTI): Primary | ICD-10-CM

## 2025-08-18 DIAGNOSIS — M11.20 CHONDROCALCINOSIS: ICD-10-CM

## 2025-08-18 PROCEDURE — 1126F AMNT PAIN NOTED NONE PRSNT: CPT | Performed by: INTERNAL MEDICINE

## 2025-08-18 PROCEDURE — 99215 OFFICE O/P EST HI 40 MIN: CPT | Performed by: INTERNAL MEDICINE

## 2025-08-18 PROCEDURE — 1159F MED LIST DOCD IN RCRD: CPT | Performed by: INTERNAL MEDICINE

## 2025-08-18 PROCEDURE — 3078F DIAST BP <80 MM HG: CPT | Performed by: INTERNAL MEDICINE

## 2025-08-18 PROCEDURE — 3077F SYST BP >= 140 MM HG: CPT | Performed by: INTERNAL MEDICINE

## 2025-08-18 RX ORDER — METHOTREXATE 2.5 MG/1
10 TABLET ORAL WEEKLY
Qty: 68 TABLET | Refills: 0 | Status: SHIPPED | OUTPATIENT
Start: 2025-08-18 | End: 2025-12-16

## 2025-08-18 RX ORDER — PREDNISONE 1 MG/1
TABLET ORAL
Qty: 120 TABLET | Refills: 0 | Status: SHIPPED | OUTPATIENT
Start: 2025-08-18 | End: 2025-11-16

## 2025-08-18 RX ORDER — FOLIC ACID 1 MG/1
1 TABLET ORAL DAILY
Qty: 180 TABLET | Refills: 0 | Status: SHIPPED | OUTPATIENT
Start: 2025-08-18 | End: 2026-02-14

## 2025-08-18 RX ORDER — ALENDRONATE SODIUM 70 MG/1
70 TABLET ORAL
Qty: 25 TABLET | Refills: 0 | Status: SHIPPED | OUTPATIENT
Start: 2025-08-18 | End: 2026-02-14

## 2025-08-18 ASSESSMENT — COLUMBIA-SUICIDE SEVERITY RATING SCALE - C-SSRS
2. HAVE YOU ACTUALLY HAD ANY THOUGHTS OF KILLING YOURSELF?: NO
1. IN THE PAST MONTH, HAVE YOU WISHED YOU WERE DEAD OR WISHED YOU COULD GO TO SLEEP AND NOT WAKE UP?: NO
6. HAVE YOU EVER DONE ANYTHING, STARTED TO DO ANYTHING, OR PREPARED TO DO ANYTHING TO END YOUR LIFE?: NO

## 2025-08-18 ASSESSMENT — PAIN SCALES - GENERAL: PAINLEVEL_OUTOF10: 0-NO PAIN

## 2025-08-19 LAB
ALBUMIN SERPL-MCNC: 4.8 G/DL (ref 3.6–5.1)
ALP SERPL-CCNC: 62 U/L (ref 37–153)
ALT SERPL-CCNC: 19 U/L (ref 6–29)
ANION GAP SERPL CALCULATED.4IONS-SCNC: 12 MMOL/L (CALC) (ref 7–17)
AST SERPL-CCNC: 24 U/L (ref 10–35)
BASOPHILS # BLD AUTO: 43 CELLS/UL (ref 0–200)
BASOPHILS NFR BLD AUTO: 0.4 %
BILIRUB SERPL-MCNC: 0.6 MG/DL (ref 0.2–1.2)
BUN SERPL-MCNC: 22 MG/DL (ref 7–25)
CALCIUM SERPL-MCNC: 10 MG/DL (ref 8.6–10.4)
CHLORIDE SERPL-SCNC: 106 MMOL/L (ref 98–110)
CO2 SERPL-SCNC: 24 MMOL/L (ref 20–32)
CREAT SERPL-MCNC: 0.84 MG/DL (ref 0.6–0.95)
CRP SERPL-MCNC: 4.8 MG/L
EGFRCR SERPLBLD CKD-EPI 2021: 70 ML/MIN/1.73M2
EOSINOPHIL # BLD AUTO: 11 CELLS/UL (ref 15–500)
EOSINOPHIL NFR BLD AUTO: 0.1 %
ERYTHROCYTE [DISTWIDTH] IN BLOOD BY AUTOMATED COUNT: 14.4 % (ref 11–15)
ERYTHROCYTE [SEDIMENTATION RATE] IN BLOOD BY WESTERGREN METHOD: 28 MM/H
GLUCOSE SERPL-MCNC: 105 MG/DL (ref 65–139)
HCT VFR BLD AUTO: 42 % (ref 35–45)
HGB BLD-MCNC: 13.4 G/DL (ref 11.7–15.5)
LYMPHOCYTES # BLD AUTO: 1220 CELLS/UL (ref 850–3900)
LYMPHOCYTES NFR BLD AUTO: 11.3 %
MCH RBC QN AUTO: 32.9 PG (ref 27–33)
MCHC RBC AUTO-ENTMCNC: 31.9 G/DL (ref 32–36)
MCV RBC AUTO: 103.2 FL (ref 80–100)
MONOCYTES # BLD AUTO: 410 CELLS/UL (ref 200–950)
MONOCYTES NFR BLD AUTO: 3.8 %
NEUTROPHILS # BLD AUTO: 9115 CELLS/UL (ref 1500–7800)
NEUTROPHILS NFR BLD AUTO: 84.4 %
PLATELET # BLD AUTO: 298 THOUSAND/UL (ref 140–400)
PMV BLD REES-ECKER: 9.9 FL (ref 7.5–12.5)
POTASSIUM SERPL-SCNC: 4.3 MMOL/L (ref 3.5–5.3)
PROT SERPL-MCNC: 7.3 G/DL (ref 6.1–8.1)
RBC # BLD AUTO: 4.07 MILLION/UL (ref 3.8–5.1)
SODIUM SERPL-SCNC: 142 MMOL/L (ref 135–146)
WBC # BLD AUTO: 10.8 THOUSAND/UL (ref 3.8–10.8)

## 2025-08-26 DIAGNOSIS — I10 BENIGN HYPERTENSION: ICD-10-CM

## 2025-08-27 RX ORDER — AMLODIPINE BESYLATE 10 MG/1
10 TABLET ORAL DAILY
Qty: 100 TABLET | Refills: 2 | Status: SHIPPED | OUTPATIENT
Start: 2025-08-27